# Patient Record
Sex: FEMALE | Race: BLACK OR AFRICAN AMERICAN | Employment: UNEMPLOYED | URBAN - METROPOLITAN AREA
[De-identification: names, ages, dates, MRNs, and addresses within clinical notes are randomized per-mention and may not be internally consistent; named-entity substitution may affect disease eponyms.]

---

## 2018-04-21 ENCOUNTER — OFFICE VISIT (OUTPATIENT)
Dept: CARDIOLOGY | Facility: CLINIC | Age: 73
End: 2018-04-21
Payer: COMMERCIAL

## 2018-04-21 VITALS
OXYGEN SATURATION: 97 % | DIASTOLIC BLOOD PRESSURE: 81 MMHG | SYSTOLIC BLOOD PRESSURE: 142 MMHG | HEART RATE: 77 BPM | WEIGHT: 202.6 LBS

## 2018-04-21 DIAGNOSIS — R07.89 ATYPICAL CHEST PAIN: Primary | ICD-10-CM

## 2018-04-21 DIAGNOSIS — R06.09 DOE (DYSPNEA ON EXERTION): ICD-10-CM

## 2018-04-21 PROCEDURE — 99204 OFFICE O/P NEW MOD 45 MIN: CPT | Mod: ZP | Performed by: INTERNAL MEDICINE

## 2018-04-21 PROCEDURE — G0463 HOSPITAL OUTPT CLINIC VISIT: HCPCS

## 2018-04-21 RX ORDER — BUDESONIDE AND FORMOTEROL FUMARATE DIHYDRATE 160; 4.5 UG/1; UG/1
2 AEROSOL RESPIRATORY (INHALATION)
COMMUNITY
Start: 2014-03-26

## 2018-04-21 RX ORDER — PREDNISONE 5 MG/1
15 TABLET ORAL
COMMUNITY
Start: 2016-06-21

## 2018-04-21 RX ORDER — LISINOPRIL AND HYDROCHLOROTHIAZIDE 12.5; 2 MG/1; MG/1
1 TABLET ORAL
COMMUNITY
Start: 2013-11-15

## 2018-04-21 RX ORDER — IBUPROFEN 200 MG
200 TABLET ORAL
COMMUNITY
Start: 2017-10-25

## 2018-04-21 RX ORDER — ACETAMINOPHEN 325 MG/1
650 TABLET ORAL
COMMUNITY
Start: 2017-10-25

## 2018-04-21 RX ORDER — OMEPRAZOLE 40 MG/1
40 CAPSULE, DELAYED RELEASE ORAL
COMMUNITY
Start: 2016-01-12

## 2018-04-21 RX ORDER — BLOOD PRESSURE TEST KIT-MEDIUM
KIT MISCELLANEOUS
COMMUNITY
Start: 2013-01-21

## 2018-04-21 RX ORDER — ALBUTEROL SULFATE 90 UG/1
2 AEROSOL, METERED RESPIRATORY (INHALATION)
COMMUNITY
Start: 2017-10-25

## 2018-04-21 ASSESSMENT — PAIN SCALES - GENERAL: PAINLEVEL: MODERATE PAIN (5)

## 2018-04-21 NOTE — LETTER
4/21/2018      RE: Priya Trujillo  C/O RIDDHI VALENTIN  [address]  [facility] [address]       Dear Colleague,    Thank you for the opportunity to participate in the care of your patient, Priya Trujillo, at the [facility]. Please see a copy of my visit note below.       SUBJECTIVE:  Priya Trujillo is a 73 year old female who presents for evaluation of CARDOZA and Chest pain.  History difficult and through .    States she has exertional chest pain for long time. Now it is getting worse. Associated with CARDOZA. No PND. No other associated symptoms. Pain radiates to left arm.    HTN+. No other risk factors.    There are no active problems to display for this patient.   .  Current Outpatient Prescriptions   Medication Sig     acetaminophen (TYLENOL) 325 MG tablet Take 650 mg by mouth     albuterol (PROAIR HFA/PROVENTIL HFA/VENTOLIN HFA) 108 (90 Base) MCG/ACT Inhaler Inhale 2 puffs into the lungs     Blood Pressure Monitoring (RA BLOOD PRESSURE CUFF MONITOR) MISC Measure BP daily in the morning.     budesonide-formoterol (SYMBICORT) 160-4.5 MCG/ACT Inhaler Inhale 2 puffs into the lungs     calcium-vitamin D (CALTRATE) 600-400 MG-UNIT per tablet Take 1 tablet by mouth 2 times daily     COMPRESSION STOCKINGS 20-30 mm/Hg pantyhose style compression stockings - Leg Edema     ibuprofen (ADVIL/MOTRIN) 200 MG tablet Take 200 mg by mouth     ketotifen (ZADITOR/REFRESH ANTI-ITCH) 0.025 % SOLN ophthalmic solution 1 drop     LEVOFLOXACIN PO Take 500 mg by mouth daily     lisinopril-hydrochlorothiazide (PRINZIDE/ZESTORETIC) 20-12.5 MG per tablet Take 1 tablet by mouth     omeprazole (PRILOSEC) 40 MG capsule Take 40 mg by mouth     predniSONE (DELTASONE) 5 MG tablet Take 15 mg by mouth     No current facility-administered medications for this visit.      No past medical history on file.  No past surgical history on file.  Allergies   Allergen Reactions     Guaifenesin-Dm  Cr      Eyes and facial swelling, irregular heart beat. Also took tramadol at that time.     Naproxen      Body felt swollen, gastric upset     Sulfamethoxazole-Trimethoprim Hives     Tramadol      Swelling, constipation, ill feeling     Social History     Social History     Marital status: Single     Spouse name: N/A     Number of children: N/A     Years of education: N/A     Occupational History     Not on file.     Social History Main Topics     Smoking status: Never Smoker     Smokeless tobacco: Never Used     Alcohol use No     Drug use: No     Sexual activity: Not on file     Other Topics Concern     Not on file     Social History Narrative     No narrative on file     No family history on file.       REVIEW OF SYSTEMS:  General: negative, fever, chills, night sweats  Skin: negative, acne, rash and scaling  Eyes: negative, double vision, eye pain and photophobia  Ears/Nose/Throat: negative, nasal congestion and purulent rhinorrhea  Respiratory: No cough, No hemoptysis and negative  Cardiovascular: negative, palpitations, tachycardia, irregular heart beat, chest pain, paroxysmal nocturnal dyspnea and orthopnea  Gastrointestinal: negative, dysphagia, nausea and vomiting  Genitourinary: negative, nocturia, dysuria and frequency  Musculoskeletal: negative, fracture, back pain and neck pain  Neurologic: negative, headaches, syncope, stroke and seizures  Psychiatric: negative, nervous breakdown, thoughts of self-harm and thoughts of hurting someone else  Hematologic/Lymphatic/Immunologic: negative, bleeding disorder, chills and fever  Endocrine: negative, cold intolerance, heat intolerance and hot flashes       OBJECTIVE:  Blood pressure 142/81, pulse 77, weight 91.9 kg (202 lb 9.6 oz), SpO2 97 %.  General Appearance: alert and no distress  Head: Normocephalic. No masses, lesions, tenderness or abnormalities  Eyes: conjuctiva clear, PERRL, EOM intact  Ears: External ears normal. Canals clear. TM's normal.  Nose:  Nares normal  Mouth: normal  Neck: Supple, no cervical adenopathy, no thyromegaly  Lungs: clear to auscultation  Cardiac: regular rate and rhythm, normal S1 and S2, no murmur  Abdomen: Soft, nontender.  Normal bowel sounds.  No hepatosplenomegaly or abnormal masses  Extremities: no peripheral edema, peripheral pulses normal  Musculoskeletal: negative  Neurological: Cranial nerves 2-12 intact, motor strength intact       ASSESSMENT/PLAN:  Here for evaluation of chest pain.  Had symptoms for long time. Now progressive.  HTN+. No other risk factors.  Not very active.overweight.  Will plan for a DSE to evaluate symptoms.  Per orders.   Return to Clinic PRN.    Sincerely,     DAVEY Bradley MD

## 2018-04-21 NOTE — PATIENT INSTRUCTIONS
Patient Instructions:  It was a pleasure to see you in the cardiology clinic today.      If you have any questions, you can reach my nurse, Chuck Elliott, at [phone].  Press Option #1 for the [facility], and then press Option #3 for nursing.    We are encouraging the use of [employer] to communicate with your HealthCare Provider    Medication Changes:    Studies Ordered:  1). Dobutamine Stress Echo for  04/27/18 @ 2pm    The results from today include:None    Please follow up: We will call you with the results    Sincerely,    DAVEY Reynoso MD     If you have an urgent need after hours (8:00 am to 4:30 pm) please call [phone] and ask for the cardiology fellow on call.            Dobutamine Stress Echocardiography (Echo)  This type of echocardiogram involves using dobutamine, a medicine that stimulates your heart similar to the way exercise does. It increases your heart rate and the squeezing function of your heart. Your healthcare provider gives you dobutamine through an IV. He or she then takes ultrasound pictures of your heart, using sound waves through a device placed on your chest wall (echocardiography). The pictures are taken before and after you get dobutamine. This lets your healthcare provider see if blood is flowing properly through the heart and if your heart is pumping normally. The test is often done in a hospital or cardiac testing center.    Before your test  Tell your healthcare provider what medicines you take, and ask if you should take them before the test. Don t smoke, drink alcohol, or have any caffeine for 12 hours before the test, or as directed by your healthcare provider. Don't eat for at least 4 hours before the test. Make sure to wear a two-piece outfit. You may need to undress from the waist up and put on a short hospital gown. Allow an extra hour for checking in and getting ready for the test.    During your test    Your healthcare provider places small  pads (electrodes) on your chest to record the electrical activity of your heart.    He or she starts an intravenous (IV) line in your arm.    A painless device (transducer) coated with cool gel is moved firmly over your chest. This device creates ultrasonic, inaudible sound waves that make images of your heart on a screen.    Your healthcare provider slowly gives you dobutamine through the IV, in small doses about every 3 minutes. It is normal to feel your heart pound for a few minutes, while the medicine is being given.    Your healthcare provider will take echo images before the infusion of the drug, during the infusion, and after your pulse returns to normal. Your healthcare provider may give you a second medicine to slow your heartbeat to a normal level.    Your healthcare provider will monitor your heart and blood pressure during and after the test.  After your test  When the test is over, you may return to your normal routine. Ask your healthcare provider about taking any medicine that you were told to skip before the test. Your healthcare provider will discuss your test results with you. The test results help your provider plan your treatment and any other tests you may need.  Report any symptoms  Be sure to tell your healthcare provider if you feel any of the following during the test:    Chest, arm, or jaw discomfort    Irregular heartbeat    Feeling flushed    Palpitations    Shortness of breath    Nausea    Headache    Numbness    Lightheadedness or like you might faint   Date Last Reviewed: 1/1/2017 2000-2017 [employer]. [address] [place] [address], [place] [address]. All rights reserved. This information is not intended as a substitute for professional medical care. Always follow your healthcare professional's instructions.

## 2018-04-21 NOTE — NURSING NOTE
Chief Complaint   Patient presents with     New Patient     Shortness of breath on excertion, abnormal stress test-recs will be faxed, appt      Vitals were taken and medications were reconciled.     Rozina Blanchard RMA  10:34 AM

## 2018-04-21 NOTE — PROGRESS NOTES
SUBJECTIVE:  Priya Trujillo is a 73 year old female who presents for evaluation of CARDOZA and Chest pain.  History difficult and through .    States she has exertional chest pain for long time. Now it is getting worse. Associated with CARDOZA. No PND. No other associated symptoms. Pain radiates to left arm.    HTN+. No other risk factors.    There are no active problems to display for this patient.   .  Current Outpatient Prescriptions   Medication Sig     acetaminophen (TYLENOL) 325 MG tablet Take 650 mg by mouth     albuterol (PROAIR HFA/PROVENTIL HFA/VENTOLIN HFA) 108 (90 Base) MCG/ACT Inhaler Inhale 2 puffs into the lungs     Blood Pressure Monitoring (RA BLOOD PRESSURE CUFF MONITOR) MISC Measure BP daily in the morning.     budesonide-formoterol (SYMBICORT) 160-4.5 MCG/ACT Inhaler Inhale 2 puffs into the lungs     calcium-vitamin D (CALTRATE) 600-400 MG-UNIT per tablet Take 1 tablet by mouth 2 times daily     COMPRESSION STOCKINGS 20-30 mm/Hg pantyhose style compression stockings - Leg Edema     ibuprofen (ADVIL/MOTRIN) 200 MG tablet Take 200 mg by mouth     ketotifen (ZADITOR/REFRESH ANTI-ITCH) 0.025 % SOLN ophthalmic solution 1 drop     LEVOFLOXACIN PO Take 500 mg by mouth daily     lisinopril-hydrochlorothiazide (PRINZIDE/ZESTORETIC) 20-12.5 MG per tablet Take 1 tablet by mouth     omeprazole (PRILOSEC) 40 MG capsule Take 40 mg by mouth     predniSONE (DELTASONE) 5 MG tablet Take 15 mg by mouth     No current facility-administered medications for this visit.      No past medical history on file.  No past surgical history on file.  Allergies   Allergen Reactions     Guaifenesin-Dm Cr      Eyes and facial swelling, irregular heart beat. Also took tramadol at that time.     Naproxen      Body felt swollen, gastric upset     Sulfamethoxazole-Trimethoprim Hives     Tramadol      Swelling, constipation, ill feeling     Social History     Social History     Marital status: Single     Spouse name: N/A      Number of children: N/A     Years of education: N/A     Occupational History     Not on file.     Social History Main Topics     Smoking status: Never Smoker     Smokeless tobacco: Never Used     Alcohol use No     Drug use: No     Sexual activity: Not on file     Other Topics Concern     Not on file     Social History Narrative     No narrative on file     No family history on file.       REVIEW OF SYSTEMS:  General: negative, fever, chills, night sweats  Skin: negative, acne, rash and scaling  Eyes: negative, double vision, eye pain and photophobia  Ears/Nose/Throat: negative, nasal congestion and purulent rhinorrhea  Respiratory: No cough, No hemoptysis and negative  Cardiovascular: negative, palpitations, tachycardia, irregular heart beat, chest pain, paroxysmal nocturnal dyspnea and orthopnea  Gastrointestinal: negative, dysphagia, nausea and vomiting  Genitourinary: negative, nocturia, dysuria and frequency  Musculoskeletal: negative, fracture, back pain and neck pain  Neurologic: negative, headaches, syncope, stroke and seizures  Psychiatric: negative, nervous breakdown, thoughts of self-harm and thoughts of hurting someone else  Hematologic/Lymphatic/Immunologic: negative, bleeding disorder, chills and fever  Endocrine: negative, cold intolerance, heat intolerance and hot flashes       OBJECTIVE:  Blood pressure 142/81, pulse 77, weight 91.9 kg (202 lb 9.6 oz), SpO2 97 %.  General Appearance: alert and no distress  Head: Normocephalic. No masses, lesions, tenderness or abnormalities  Eyes: conjuctiva clear, PERRL, EOM intact  Ears: External ears normal. Canals clear. TM's normal.  Nose: Nares normal  Mouth: normal  Neck: Supple, no cervical adenopathy, no thyromegaly  Lungs: clear to auscultation  Cardiac: regular rate and rhythm, normal S1 and S2, no murmur  Abdomen: Soft, nontender.  Normal bowel sounds.  No hepatosplenomegaly or abnormal masses  Extremities: no peripheral edema, peripheral pulses  normal  Musculoskeletal: negative  Neurological: Cranial nerves 2-12 intact, motor strength intact       ASSESSMENT/PLAN:  Here for evaluation of chest pain.  Had symptoms for long time. Now progressive.  HTN+. No other risk factors.  Not very active.overweight.  Will plan for a DSE to evaluate symptoms.  Per orders.   Return to Clinic PRN.

## 2018-04-21 NOTE — MR AVS SNAPSHOT
After Visit Summary   4/21/2018    Priya Trujillo    MRN: [id number]           Patient Information     Date Of Birth          1945        Visit Information        Provider Department      4/21/2018 10:15 AM DAVEY Bradley MD; [facility]        Today's Diagnoses     Atypical chest pain    -  1      Care Instructions    Patient Instructions:  It was a pleasure to see you in the cardiology clinic today.      If you have any questions, you can reach my nurse, Chuck Elliott, at [phone].  Press Option #1 for the [facility], and then press Option #3 for nursing.    We are encouraging the use of [employer] to communicate with your HealthCare Provider    Medication Changes:    Studies Ordered:  1). Dobutamine Stress Echo for  04/27/18 @ 2pm    The results from today include:None    Please follow up: We will call you with the results    Sincerely,    DAVEY Reynoso MD     If you have an urgent need after hours (8:00 am to 4:30 pm) please call [phone] and ask for the cardiology fellow on call.            Dobutamine Stress Echocardiography (Echo)  This type of echocardiogram involves using dobutamine, a medicine that stimulates your heart similar to the way exercise does. It increases your heart rate and the squeezing function of your heart. Your healthcare provider gives you dobutamine through an IV. He or she then takes ultrasound pictures of your heart, using sound waves through a device placed on your chest wall (echocardiography). The pictures are taken before and after you get dobutamine. This lets your healthcare provider see if blood is flowing properly through the heart and if your heart is pumping normally. The test is often done in a hospital or cardiac testing center.    Before your test  Tell your healthcare provider what medicines you take, and ask if you should take them before the test. Don t smoke, drink alcohol, or have  any caffeine for 12 hours before the test, or as directed by your healthcare provider. Don't eat for at least 4 hours before the test. Make sure to wear a two-piece outfit. You may need to undress from the waist up and put on a short hospital gown. Allow an extra hour for checking in and getting ready for the test.    During your test    Your healthcare provider places small pads (electrodes) on your chest to record the electrical activity of your heart.    He or she starts an intravenous (IV) line in your arm.    A painless device (transducer) coated with cool gel is moved firmly over your chest. This device creates ultrasonic, inaudible sound waves that make images of your heart on a screen.    Your healthcare provider slowly gives you dobutamine through the IV, in small doses about every 3 minutes. It is normal to feel your heart pound for a few minutes, while the medicine is being given.    Your healthcare provider will take echo images before the infusion of the drug, during the infusion, and after your pulse returns to normal. Your healthcare provider may give you a second medicine to slow your heartbeat to a normal level.    Your healthcare provider will monitor your heart and blood pressure during and after the test.  After your test  When the test is over, you may return to your normal routine. Ask your healthcare provider about taking any medicine that you were told to skip before the test. Your healthcare provider will discuss your test results with you. The test results help your provider plan your treatment and any other tests you may need.  Report any symptoms  Be sure to tell your healthcare provider if you feel any of the following during the test:    Chest, arm, or jaw discomfort    Irregular heartbeat    Feeling flushed    Palpitations    Shortness of breath    Nausea    Headache    Numbness    Lightheadedness or like you might faint   Date Last Reviewed: 1/1/2017 2000-2017 [employer]  [employer] [address] [place] [address]. All rights reserved. This information is not intended as a substitute for professional medical care. Always follow your healthcare professional's instructions.                Follow-ups after your visit        Your next 10 appointments already scheduled     Apr 27, 2018  2:00 PM CDT   (Arrive by 1:45 PM)   NM INJECTION with UUNMINJ1   [facility], Nuclear Medicine ([facility])    [address] [address]   [phone]            Apr 27, 2018  2:45 PM CDT   (Arrive by 2:30 PM)   NM SCAN3 with UUNM1   [facility], Nuclear Medicine ([facility])    [address] [address]   [phone]            Apr 27, 2018  3:15 PM CDT   Ekg Stress Nm Dobutamine with UUEKGNMS   [facility] ([facility])    [address] [facility] [address]               Apr 27, 2018  4:15 PM CDT   (Arrive by 4:00 PM)   NM MPI DOBUTAMINE with UUNM1   [facility], Nuclear Medicine ([facility])    [address] [address]   [phone]           For a ONE day exam: Allow 3-4 hours for test. For a TWO day exam: Allow  minutes PER day for test.  On the day of your resting scan: Please stop eating 3 hours before the test. You may drink water or juice.  On the day of your stress test: Stop all caffeine 12 hours before the test. This includes coffee, tea, soda pop, chocolate and certain medicines (such as Anacin, Excedrin and NoDoz). Also avoid decaf coffee and tea, as these contain small amounts of caffeine.  Stop eating 3 hours before the test. You may drink water.  You may need to stop some medicines before the test. Follow your doctor s orders. - If you take a beta blocker: Do not take your beta-blocker on the day before your test,  "unless specifically told to by your doctor. And do not take it on the day of your test. Bring it with you to take after the test. - If you take Aggrenox or dipyridamole (Persantine, Permole), stop taking it 48 hours before your test. - If you take Viagra, Cialis or Levitra, stop taking it 48 hours before your test. - If you take theophylline or aminophylline, stop taking it 12 hours before your test.  Do not take nitrates on the day of your test. Do not wear your Nitro-Patch.  Please wear a loose two-piece outfit. If you will have an exercise test, bring rubber-soled walking shoes.  When you arrive, please tell us if you have a pacemaker or ICD (implantable defibrillator).  Please call your Imaging Department at your exam site with any questions.              Future tests that were ordered for you today     Open Future Orders        Priority Expected Expires Ordered    NM Dobutamine stress test Routine 4/21/2018 7/20/2018 4/21/2018            Who to contact     If you have questions or need follow up information about today's clinic visit or your schedule please contact [facility] directly at [phone].  Normal or non-critical lab and imaging results will be communicated to you by [employer], letter or phone within 4 business days after the clinic has received the results. If you do not hear from us within 7 days, please contact the clinic through [employer] or phone. If you have a critical or abnormal lab result, we will notify you by phone as soon as possible.  Submit refill requests through [employer] or call your pharmacy and they will forward the refill request to us. Please allow 3 business days for your refill to be completed.          Additional Information About Your Visit        People [employer] Remember Information     [employer] Remember lets you send messages to your doctor, view your test results, renew your prescriptions, schedule appointments and more. To sign up, go to [email or web address] . Click on \"Log in\" on the left " "side of the screen, which will take you to the Welcome page. Then click on \"Sign up Now\" on the right side of the page.     You will be asked to enter the access code listed below, as well as some personal information. Please follow the directions to create your username and password.     Your access code is: [email or web address]  Expires: 2018  6:31 AM     Your access code will  in 90 days. If you need help or a new code, please call your [place] [facility] clinic or [phone].        Care EveryWhere ID     This is your Care EveryWhere ID. This could be used by other organizations to access your [facility] medical records  [id number]        Your Vitals Were     Pulse Pulse Oximetry                77 97%           Blood Pressure from Last 3 Encounters:   18 142/81    Weight from Last 3 Encounters:   18 91.9 kg (202 lb 9.6 oz)               Primary Care Provider Office Phone # Fax #    Roverto Mccrary [phone]       [facility] [address] [place] [address]        Equal Access to Services     [facility] Hospital[facility] : Hadii aad ku hadasho Soyung, waaxda luqadaha, qaybta kaalmada elvie, jessica reynoso . So [facility] [phone].    ATENCIÓN: Si habla español, tiene a quesada disposición servicios gratuitos de asistencia lingüística. [facility] [phone].    We comply with applicable federal civil rights laws and [place] laws. We do not discriminate on the basis of race, color, national origin, age, disability, sex, sexual orientation, or gender identity.            Thank you!     Thank you for choosing [facility]  for your care. Our goal is always to provide you with excellent care. Hearing back from our patients is one way we can continue to improve our services. Please take a few minutes to complete the written survey that you may receive in the mail after your visit with us. Thank you!             Your Updated Medication List - Protect others around " you: Learn how to safely use, store and throw away your medicines at www.disposemymeds.org.          This list is accurate as of 4/21/18 10:52 AM.  Always use your most recent med list.                   Brand Name Dispense Instructions for use Diagnosis    acetaminophen 325 MG tablet    TYLENOL     Take 650 mg by mouth        albuterol 108 (90 Base) MCG/ACT Inhaler    PROAIR HFA/PROVENTIL HFA/VENTOLIN HFA     Inhale 2 puffs into the lungs        calcium-vitamin D 600-400 MG-UNIT per tablet    CALTRATE     Take 1 tablet by mouth 2 times daily        COMPRESSION STOCKINGS      20-30 mm/Hg pantyhose style compression stockings - Leg Edema        ibuprofen 200 MG tablet    ADVIL/MOTRIN     Take 200 mg by mouth        ketotifen 0.025 % Soln ophthalmic solution    ZADITOR/REFRESH ANTI-ITCH     1 drop        LEVOFLOXACIN PO      Take 500 mg by mouth daily        lisinopril-hydrochlorothiazide 20-12.5 MG per tablet    PRINZIDE/ZESTORETIC     Take 1 tablet by mouth        omeprazole 40 MG capsule    priLOSEC     Take 40 mg by mouth        predniSONE 5 MG tablet    DELTASONE     Take 15 mg by mouth        RA BLOOD PRESSURE CUFF MONITOR Misc      Measure BP daily in the morning.        SYMBICORT 160-4.5 MCG/ACT Inhaler   Generic drug:  budesonide-formoterol      Inhale 2 puffs into the lungs

## 2018-04-27 ENCOUNTER — HOSPITAL ENCOUNTER (OUTPATIENT)
Dept: NUCLEAR MEDICINE | Facility: CLINIC | Age: 73
Setting detail: NUCLEAR MEDICINE
End: 2018-04-27
Attending: INTERNAL MEDICINE
Payer: COMMERCIAL

## 2018-04-27 ENCOUNTER — HOSPITAL ENCOUNTER (OUTPATIENT)
Dept: CARDIOLOGY | Facility: CLINIC | Age: 73
Discharge: HOME | End: 2018-04-27
Attending: INTERNAL MEDICINE | Admitting: INTERNAL MEDICINE
Payer: COMMERCIAL

## 2018-04-27 DIAGNOSIS — R07.89 ATYPICAL CHEST PAIN: ICD-10-CM

## 2018-04-27 PROCEDURE — 25000125 ZZHC RX 250: Performed by: INTERNAL MEDICINE

## 2018-04-27 PROCEDURE — 78452 HT MUSCLE IMAGE SPECT MULT: CPT

## 2018-04-27 PROCEDURE — 93017 CV STRESS TEST TRACING ONLY: CPT

## 2018-04-27 PROCEDURE — 34300033 ZZH RX 343: Performed by: INTERNAL MEDICINE

## 2018-04-27 PROCEDURE — 93018 CV STRESS TEST I&R ONLY: CPT | Performed by: INTERNAL MEDICINE

## 2018-04-27 PROCEDURE — 93016 CV STRESS TEST SUPVJ ONLY: CPT | Performed by: INTERNAL MEDICINE

## 2018-04-27 PROCEDURE — A9502 TC99M TETROFOSMIN: HCPCS | Performed by: INTERNAL MEDICINE

## 2018-04-27 PROCEDURE — 25000128 H RX IP 250 OP 636: Performed by: INTERNAL MEDICINE

## 2018-04-27 PROCEDURE — 78452 HT MUSCLE IMAGE SPECT MULT: CPT | Mod: 26 | Performed by: INTERNAL MEDICINE

## 2018-04-27 RX ORDER — METOPROLOL TARTRATE 1 MG/ML
.5-15 INJECTION, SOLUTION INTRAVENOUS
Status: DISCONTINUED | OUTPATIENT
Start: 2018-04-27 | End: 2018-04-28 | Stop reason: HOSPADM

## 2018-04-27 RX ORDER — DOBUTAMINE HYDROCHLORIDE 200 MG/100ML
5-50 INJECTION INTRAVENOUS CONTINUOUS PRN
Status: COMPLETED | OUTPATIENT
Start: 2018-04-27 | End: 2018-04-27

## 2018-04-27 RX ADMIN — METOPROLOL TARTRATE 2 MG: 1 INJECTION, SOLUTION INTRAVENOUS at 13:02

## 2018-04-27 RX ADMIN — TETROFOSMIN 36 MCI.: 1.38 INJECTION, POWDER, LYOPHILIZED, FOR SOLUTION INTRAVENOUS at 13:00

## 2018-04-27 RX ADMIN — TETROFOSMIN 9.8 MCI.: 1.38 INJECTION, POWDER, LYOPHILIZED, FOR SOLUTION INTRAVENOUS at 12:57

## 2018-04-27 RX ADMIN — ATROPINE SULFATE 0.2 MG: 0.4 INJECTION, SOLUTION INTRAMUSCULAR; INTRAVENOUS; SUBCUTANEOUS at 12:53

## 2018-04-27 RX ADMIN — DOBUTAMINE IN DEXTROSE 30 MCG/KG/MIN: 200 INJECTION, SOLUTION INTRAVENOUS at 12:46

## 2018-04-27 NOTE — PROGRESS NOTES
Pt here for NM dobutamine stress test.  Test, meds and side effects reviewed with patient.   present.  Achieved target HR at 40 mcg Dobutamine and a total of 0.2 mg IV atropine.  Gave a total of 2 mg IV Metoprolol to bring HR back to baseline.  Post monitoring complete and VSS.  Pt taken back to NM waiting area for post imaging pictures.

## 2020-03-15 ENCOUNTER — HOSPITAL ENCOUNTER (EMERGENCY)
Facility: CLINIC | Age: 75
Discharge: HOME | End: 2020-03-15
Attending: EMERGENCY MEDICINE | Admitting: EMERGENCY MEDICINE
Payer: COMMERCIAL

## 2020-03-15 ENCOUNTER — APPOINTMENT (OUTPATIENT)
Dept: CT IMAGING | Facility: CLINIC | Age: 75
End: 2020-03-15
Attending: EMERGENCY MEDICINE
Payer: COMMERCIAL

## 2020-03-15 VITALS
OXYGEN SATURATION: 100 % | SYSTOLIC BLOOD PRESSURE: 129 MMHG | WEIGHT: 203.1 LBS | RESPIRATION RATE: 16 BRPM | TEMPERATURE: 96.4 F | DIASTOLIC BLOOD PRESSURE: 76 MMHG | HEART RATE: 80 BPM

## 2020-03-15 DIAGNOSIS — R30.0 DYSURIA: ICD-10-CM

## 2020-03-15 LAB
ALBUMIN UR-MCNC: NEGATIVE MG/DL
ANION GAP SERPL CALCULATED.3IONS-SCNC: 7 MMOL/L (ref 3–14)
APPEARANCE UR: CLEAR
BASOPHILS # BLD AUTO: 0 10E9/L (ref 0–0.2)
BASOPHILS NFR BLD AUTO: 0 %
BILIRUB UR QL STRIP: NEGATIVE
BUN SERPL-MCNC: 13 MG/DL (ref 7–30)
CALCIUM SERPL-MCNC: 8.6 MG/DL (ref 8.5–10.1)
CHLORIDE SERPL-SCNC: 103 MMOL/L (ref 94–109)
CO2 SERPL-SCNC: 27 MMOL/L (ref 20–32)
COLOR UR AUTO: YELLOW
CREAT SERPL-MCNC: 0.74 MG/DL (ref 0.52–1.04)
DIFFERENTIAL METHOD BLD: ABNORMAL
EOSINOPHIL # BLD AUTO: 0.1 10E9/L (ref 0–0.7)
EOSINOPHIL NFR BLD AUTO: 0.7 %
ERYTHROCYTE [DISTWIDTH] IN BLOOD BY AUTOMATED COUNT: 18.6 % (ref 10–15)
GFR SERPL CREATININE-BSD FRML MDRD: 80 ML/MIN/{1.73_M2}
GLUCOSE SERPL-MCNC: 105 MG/DL (ref 70–99)
GLUCOSE UR STRIP-MCNC: NEGATIVE MG/DL
HCT VFR BLD AUTO: 38 % (ref 35–47)
HGB BLD-MCNC: 12.5 G/DL (ref 11.7–15.7)
HGB UR QL STRIP: NEGATIVE
IMM GRANULOCYTES # BLD: 0.1 10E9/L (ref 0–0.4)
IMM GRANULOCYTES NFR BLD: 1 %
KETONES UR STRIP-MCNC: NEGATIVE MG/DL
LEUKOCYTE ESTERASE UR QL STRIP: NEGATIVE
LYMPHOCYTES # BLD AUTO: 2.9 10E9/L (ref 0.8–5.3)
LYMPHOCYTES NFR BLD AUTO: 24.5 %
MCH RBC QN AUTO: 25.7 PG (ref 26.5–33)
MCHC RBC AUTO-ENTMCNC: 32.9 G/DL (ref 31.5–36.5)
MCV RBC AUTO: 78 FL (ref 78–100)
MONOCYTES # BLD AUTO: 1.1 10E9/L (ref 0–1.3)
MONOCYTES NFR BLD AUTO: 9.5 %
MUCOUS THREADS #/AREA URNS LPF: PRESENT /LPF
NEUTROPHILS # BLD AUTO: 7.7 10E9/L (ref 1.6–8.3)
NEUTROPHILS NFR BLD AUTO: 64.3 %
NITRATE UR QL: NEGATIVE
NRBC # BLD AUTO: 0 10*3/UL
NRBC BLD AUTO-RTO: 0 /100
PH UR STRIP: 6 PH (ref 5–7)
PLATELET # BLD AUTO: 289 10E9/L (ref 150–450)
POTASSIUM SERPL-SCNC: 3.8 MMOL/L (ref 3.4–5.3)
RBC # BLD AUTO: 4.87 10E12/L (ref 3.8–5.2)
RBC #/AREA URNS AUTO: 1 /HPF (ref 0–2)
SODIUM SERPL-SCNC: 137 MMOL/L (ref 133–144)
SOURCE: ABNORMAL
SP GR UR STRIP: 1.02 (ref 1–1.03)
SQUAMOUS #/AREA URNS AUTO: <1 /HPF (ref 0–1)
UROBILINOGEN UR STRIP-MCNC: NORMAL MG/DL (ref 0–2)
WBC # BLD AUTO: 12 10E9/L (ref 4–11)
WBC #/AREA URNS AUTO: 1 /HPF (ref 0–5)

## 2020-03-15 PROCEDURE — 74176 CT ABD & PELVIS W/O CONTRAST: CPT

## 2020-03-15 PROCEDURE — 80048 BASIC METABOLIC PNL TOTAL CA: CPT | Performed by: EMERGENCY MEDICINE

## 2020-03-15 PROCEDURE — 99284 EMERGENCY DEPT VISIT MOD MDM: CPT | Mod: 25 | Performed by: EMERGENCY MEDICINE

## 2020-03-15 PROCEDURE — 85025 COMPLETE CBC W/AUTO DIFF WBC: CPT | Performed by: EMERGENCY MEDICINE

## 2020-03-15 PROCEDURE — 87086 URINE CULTURE/COLONY COUNT: CPT | Performed by: EMERGENCY MEDICINE

## 2020-03-15 PROCEDURE — 81001 URINALYSIS AUTO W/SCOPE: CPT | Performed by: EMERGENCY MEDICINE

## 2020-03-15 PROCEDURE — 99283 EMERGENCY DEPT VISIT LOW MDM: CPT | Mod: Z6 | Performed by: EMERGENCY MEDICINE

## 2020-03-15 RX ORDER — CEPHALEXIN 500 MG/1
500 CAPSULE ORAL 4 TIMES DAILY
Qty: 28 CAPSULE | Refills: 0 | Status: SHIPPED | OUTPATIENT
Start: 2020-03-15 | End: 2020-03-22

## 2020-03-15 ASSESSMENT — ENCOUNTER SYMPTOMS
FEVER: 0
DYSURIA: 1
VOMITING: 0
BACK PAIN: 0
FLANK PAIN: 0
NAUSEA: 0
CHILLS: 0

## 2020-03-15 NOTE — ED AVS SNAPSHOT
[facility], [place], Emergency Department  [address]  [facility] [address]  Phone:  [phone]  Fax:  [phone]                                    Priya Trujillo   MRN: [id number]    Department:  [facility], Emergency Department   Date of Visit:  3/15/2020           After Visit Summary Signature Page    I have received my discharge instructions, and my questions have been answered. I have discussed any challenges I see with this plan with the nurse or doctor.    ..........................................................................................................................................  Patient/Patient Representative Signature      ..........................................................................................................................................  Patient Representative Print Name and Relationship to Patient    ..................................................               ................................................  Date                                   Time    ..........................................................................................................................................  Reviewed by Signature/Title    ...................................................              ..............................................  Date                                               Time          22EPIC Rev 08/18

## 2020-03-15 NOTE — DISCHARGE INSTRUCTIONS
Please follow up with your primary care doctor in 2 days for recheck if no better.     You can take keflex as prescribed for possible urinary tract infection.     Seek medical attention if worsening/concerns.

## 2020-03-15 NOTE — ED PROVIDER NOTES
[facility] EMERGENCY DEPARTMENT ([facility])    3/15/20        History     Chief Complaint   Patient presents with     Abdominal Pain     reports burning lower abdominal pain and back pain and groin pain; thinks she has a UTI; denies fever or chills; feels urgency and pressure and a little burning when urinating     The history is provided by the patient.  used: [employer] intepreted by professional [employer] service.     Priya Trujillo is a 75 year old female with a past medical history pertinent for UTIs and HTN who presents to the Emergency Department for complaints of abdominal pain. She states that she feels the pain below her belly button, around her back, and in her kidney and bladder area. She notes that this pain started last night. She describes the pain as a burning sensation, and that before last night she was feeling fine. She notes the pain is constant. She also notes a burning sensation in her feet, and a mild cough. She finished her course of cefdinir this morning, and is also taking prednisone. She denies any diarrhea, nausea, fever, or changes in appetite. She is no longer having periods, and denies any history of surgeries on her abdomen.     I have reviewed the Medications, Allergies, Past Medical and Surgical History, and Social History in the [employer] system.  PAST MEDICAL HISTORY:   Past Medical History:   Diagnosis Date     Uncomplicated asthma        PAST SURGICAL HISTORY:   Past Surgical History:   Procedure Laterality Date     EYE SURGERY  2019       FAMILY HISTORY: No family history on file.    SOCIAL HISTORY:   Social History     Tobacco Use     Smoking status: Never Smoker     Smokeless tobacco: Never Used   Substance Use Topics     Alcohol use: No       Discharge Medication List as of 3/15/2020  4:38 PM      START taking these medications    Details   cephALEXin (KEFLEX) 500 MG capsule Take 1 capsule (500 mg) by mouth 4 times daily for 7 days, Disp-28 capsule,R-0, Local  Print         CONTINUE these medications which have NOT CHANGED    Details   acetaminophen (TYLENOL) 325 MG tablet Take 650 mg by mouth, Historical      albuterol (PROAIR HFA/PROVENTIL HFA/VENTOLIN HFA) 108 (90 Base) MCG/ACT Inhaler Inhale 2 puffs into the lungs, Historical      Blood Pressure Monitoring (RA BLOOD PRESSURE CUFF MONITOR) MISC Measure BP daily in the morning., Historical      budesonide-formoterol (SYMBICORT) 160-4.5 MCG/ACT Inhaler Inhale 2 puffs into the lungs, Historical      calcium-vitamin D (CALTRATE) 600-400 MG-UNIT per tablet Take 1 tablet by mouth 2 times daily, Historical      COMPRESSION STOCKINGS 20-30 mm/Hg pantyhose style compression stockings - Leg Edema, Historical      ibuprofen (ADVIL/MOTRIN) 200 MG tablet Take 200 mg by mouth, Historical      ketotifen (ZADITOR/REFRESH ANTI-ITCH) 0.025 % SOLN ophthalmic solution 1 drop, Historical      LEVOFLOXACIN PO Take 500 mg by mouth daily, Historical      lisinopril-hydrochlorothiazide (PRINZIDE/ZESTORETIC) 20-12.5 MG per tablet Take 1 tablet by mouth, Historical      omeprazole (PRILOSEC) 40 MG capsule Take 40 mg by mouth, Historical      predniSONE (DELTASONE) 5 MG tablet Take 15 mg by mouth, Historical                Allergies   Allergen Reactions     Guaifenesin-Dm Cr      Eyes and facial swelling, irregular heart beat. Also took tramadol at that time.     Naproxen      Body felt swollen, gastric upset     Sulfamethoxazole-Trimethoprim Hives     Tramadol      Swelling, constipation, ill feeling        Review of Systems   Constitutional: Negative for chills and fever.   Gastrointestinal: Negative for nausea and vomiting. Abdominal distention: suprapubic discomfort.   Genitourinary: Positive for dysuria. Negative for flank pain.   Musculoskeletal: Negative for back pain.   All other systems reviewed and are negative.      Physical Exam   BP: 129/76  Pulse: 80  Temp: 96.4  F (35.8  C)  Resp: 16  Weight: 92.1 kg (203 lb 1.6 oz)  SpO2: 100  %      Physical Exam  Vitals signs and nursing note reviewed.   Constitutional:       Appearance: She is well-developed.   HENT:      Head: Normocephalic and atraumatic.      Nose: Nose normal.      Mouth/Throat:      Mouth: Mucous membranes are moist.   Eyes:      Extraocular Movements: Extraocular movements intact.   Neck:      Musculoskeletal: Normal range of motion and neck supple.   Cardiovascular:      Rate and Rhythm: Normal rate and regular rhythm.      Pulses: Normal pulses.      Heart sounds: Normal heart sounds.   Pulmonary:      Effort: Pulmonary effort is normal.      Breath sounds: Normal breath sounds.   Abdominal:      General: Abdomen is flat.      Palpations: Abdomen is soft.      Tenderness: There is no abdominal tenderness. There is no right CVA tenderness or left CVA tenderness.   Musculoskeletal: Normal range of motion.         General: No swelling, tenderness or deformity.      Right lower leg: Normal. No edema.      Left lower leg: Normal. No edema.      Right foot: Normal.      Left foot: Normal.   Skin:     General: Skin is warm and dry.   Neurological:      General: No focal deficit present.      Mental Status: She is alert.   Psychiatric:         Mood and Affect: Mood normal.         Behavior: Behavior normal.         ED Course   11:36 AM  The patient was seen and examined by Daniella Bingham MD in Room ED04.      Procedures                 No results found for this or any previous visit (from the past 24 hour(s)).  Medications - No data to display          Assessments & Plan (with Medical Decision Making)   The patient presents to the ED for 1 days of pain with urination and no systemic symptoms. No back pain on exam.  On exam she appears well. Vitals stable. UA was checked and is negative.  UC was sent.  WBC is 12 but she is finishing prednisone.  Bmp is normal except glucose 105. CT abd/pelvis without contrast was done and shows moderate amount of stool in ascending and transverse colon, no  calculi.  The cause of her pain is unclear.  Given she is still experiencing dysuria I will treat with keflex and send UC.  I have asked that she follow up with pmd in 2 days for recheck.  Return if worsening.     I have reviewed the nursing notes.    I have reviewed the findings, diagnosis, plan and need for follow up with the patient.    Discharge Medication List as of 3/15/2020  4:38 PM      START taking these medications    Details   cephALEXin (KEFLEX) 500 MG capsule Take 1 capsule (500 mg) by mouth 4 times daily for 7 days, Disp-28 capsule,R-0, Local Print             Final diagnoses:   Dysuria       3/15/2020   [facility], [place], EMERGENCY DEPARTMENT    IAndre, am serving as a trained medical scribe to document services personally performed by Daniella Bingham MD, based on the provider's statements to me.     IDaniella MD, was physically present and have reviewed and verified the accuracy of this note documented by Andre Carbajal.       Daniella Bingham MD  03/28/20 [id number]

## 2020-03-16 LAB
BACTERIA SPEC CULT: NORMAL
Lab: NORMAL
SPECIMEN SOURCE: NORMAL

## 2024-01-23 ENCOUNTER — MEDICAL CORRESPONDENCE (OUTPATIENT)
Dept: HEALTH INFORMATION MANAGEMENT | Facility: CLINIC | Age: 79
End: 2024-01-23
Payer: COMMERCIAL

## 2024-01-23 ENCOUNTER — TRANSFERRED RECORDS (OUTPATIENT)
Dept: HEALTH INFORMATION MANAGEMENT | Facility: CLINIC | Age: 79
End: 2024-01-23
Payer: COMMERCIAL

## 2024-02-16 ENCOUNTER — TRANSFERRED RECORDS (OUTPATIENT)
Dept: HEALTH INFORMATION MANAGEMENT | Facility: CLINIC | Age: 79
End: 2024-02-16
Payer: COMMERCIAL

## 2024-02-21 ENCOUNTER — TRANSCRIBE ORDERS (OUTPATIENT)
Dept: OTHER | Age: 79
End: 2024-02-21

## 2024-02-21 DIAGNOSIS — C57.02 MALIGNANT NEOPLASM OF FALLOPIAN TUBE, LEFT (H): Primary | ICD-10-CM

## 2024-08-02 ENCOUNTER — TRANSCRIBE ORDERS (OUTPATIENT)
Dept: OTHER | Age: 79
End: 2024-08-02

## 2024-08-02 DIAGNOSIS — C57.02 MALIGNANT NEOPLASM OF FALLOPIAN TUBE, LEFT (H): Primary | ICD-10-CM

## 2024-09-23 ENCOUNTER — OFFICE VISIT (OUTPATIENT)
Dept: RHEUMATOLOGY | Facility: CLINIC | Age: 79
End: 2024-09-23
Payer: COMMERCIAL

## 2024-09-23 VITALS
DIASTOLIC BLOOD PRESSURE: 81 MMHG | HEART RATE: 90 BPM | SYSTOLIC BLOOD PRESSURE: 127 MMHG | OXYGEN SATURATION: 97 % | WEIGHT: 200.8 LBS

## 2024-09-23 DIAGNOSIS — G62.9 NEUROPATHY: ICD-10-CM

## 2024-09-23 DIAGNOSIS — M17.12 PRIMARY OSTEOARTHRITIS OF LEFT KNEE: ICD-10-CM

## 2024-09-23 DIAGNOSIS — R53.83 FATIGUE, UNSPECIFIED TYPE: ICD-10-CM

## 2024-09-23 DIAGNOSIS — C57.02 MALIGNANT NEOPLASM OF FALLOPIAN TUBE, LEFT (H): ICD-10-CM

## 2024-09-23 DIAGNOSIS — M79.7 FIBROMYALGIA: Primary | ICD-10-CM

## 2024-09-23 LAB
CK SERPL-CCNC: 212 U/L (ref 26–192)
CRP SERPL-MCNC: 5.79 MG/L
ERYTHROCYTE [SEDIMENTATION RATE] IN BLOOD BY WESTERGREN METHOD: 32 MM/HR (ref 0–30)
RHEUMATOID FACT SERPL-ACNC: <10 IU/ML
TSH SERPL DL<=0.005 MIU/L-ACNC: 2.13 UIU/ML (ref 0.3–4.2)

## 2024-09-23 PROCEDURE — 86140 C-REACTIVE PROTEIN: CPT | Performed by: INTERNAL MEDICINE

## 2024-09-23 PROCEDURE — 84443 ASSAY THYROID STIM HORMONE: CPT | Performed by: INTERNAL MEDICINE

## 2024-09-23 PROCEDURE — 82550 ASSAY OF CK (CPK): CPT | Performed by: INTERNAL MEDICINE

## 2024-09-23 PROCEDURE — 86200 CCP ANTIBODY: CPT | Performed by: INTERNAL MEDICINE

## 2024-09-23 PROCEDURE — 86431 RHEUMATOID FACTOR QUANT: CPT | Performed by: INTERNAL MEDICINE

## 2024-09-23 PROCEDURE — 36415 COLL VENOUS BLD VENIPUNCTURE: CPT | Performed by: INTERNAL MEDICINE

## 2024-09-23 PROCEDURE — 85652 RBC SED RATE AUTOMATED: CPT | Performed by: INTERNAL MEDICINE

## 2024-09-23 PROCEDURE — 99204 OFFICE O/P NEW MOD 45 MIN: CPT | Performed by: INTERNAL MEDICINE

## 2024-09-23 NOTE — PROGRESS NOTES
Rheumatology Clinic Visit      Priya Trujillo MRN# [id number]   YOB: 1945 Age: 79 year old      Date of visit: 9/23/24   PCP: Roverto Mccrary at [facility]    Chief Complaint   Patient presents with:  Consult: Having pain in legs and feet. Pain in bones.       Assessment and Plan     1.  Fibromyalgia: Exam today is most consistent with fibromyalgia.  We reviewed the diagnosis and treatment options.  Advised that she establish care in the pain clinic.  Note that she is already followed in a pain clinic but would like to move her care to [facility].  - Pain management referral  - Labs today: ESR, CRP, RF, CCP, TSH , CK    Addendum: Rheumatology labs show only mild CRP and ESR elevations, and CK is just above the normal range.  RF and CCP negative.  TSH normal.  No identified autoimmune rheumatologic disease to explain her symptoms.    2.  Numbness and tingling of the feet: Reportedly started with cancer treatment.  Advised that she discuss with her primary care provider, her oncologist, and consider also discussing with pain management.    3.  Chronic left knee pain: Severe degenerative changes seen on outside MRI report and symptoms are consistent with OA.  2 weeks of benefit from previous steroid injection from an outside clinic per patient report, and she reports doing exercises with no improvement.  Considering that her left knee symptoms are limiting ambulation, I advised seeing orthopedics  - Orthopedic surgery referral    4.  Dizziness: Describes the room spinning.  Occurs intermittently.  Advised that she discuss with her primary care provider.    No active autoimmune rheumatologic disease identified today.     Total minutes spent in evaluation with patient, documentation, , and review of pertinent studies and chart notes: 46     Ms. Trujillo verbalized agreement with and understanding of the rational for the diagnosis and treatment plan.  All questions were answered to best of my ability and  the patient's satisfaction. Ms. Trujillo was advised to contact the clinic with any questions that may arise after the clinic visit.      Thank you for involving me in the care of the patient    Return to clinic: No scheduled return appointment in rheumatology needed at this time.     MARILYN   Priya Trujillo is a 79 year old female with a past medical history significant for asthma, hypertension, hyperlipidemia, and fallopian tube cancer who presents for initial rheumatology evaluation of diffuse body ache.    Today, 9/23/2024: Diffuse body pain for at least 3 years.  Body pain started with chemotherapy used for fallopian tube cancer.  Also experiencing numbness and tingling in the feet, worse at the toes.  No joint swelling.  Left knee pain worse with activity and improves with rest; no swelling; no increased warmth or overlying erythema; has had a steroid injection in the left knee once that resulted in no more than a couple weeks of improvement; not following with orthopedics currently.  Previous MRI that was scanned into the chart from [employer] documents marked tricompartmental left knee osteoarthritis and a complex tear of essentially the entire medial meniscus; see report for full details.  Diffuse back pain that radiates down her left leg; was followed in a pain clinic but she would like a second opinion from a different clinic.    Denies fevers, chills, nausea, vomiting, constipation, diarrhea. No abdominal pain.  No black or bloody stools.  No chest pain/pressure, palpitations, or shortness of breath. No LE swelling. No neck pain. No oral or nasal sores.  No rash. No sicca symptoms.  No jaw or tongue claudication.  No scalp tenderness.  No vision change/loss.  No new headache.  Has headaches on occasion as she has had her entire life.  Has experienced multiple occasions of dizziness where the room feels like it is spinning.  No difficulty raising her arms above her head or standing up from a chair except for the pain  of her left knee and low back    Patient denies history of polymyalgia rheumatica.  Not currently taking prednisone and when she was on prednisone in the past she did not find that it helped with any of her pain.     A Pawan  assisted the entire visit; the  was remote on the iPad via video    Tobacco: None  EtOH: None  Drugs: None    ROS   12 point review of system was completed and negative except as noted in the HPI      Active Problem List   There is no problem list on file for this patient.    Past Medical History     Past Medical History:   Diagnosis Date    Uncomplicated asthma      Past Surgical History     Past Surgical History:   Procedure Laterality Date    EYE SURGERY  2019     Allergy     Allergies   Allergen Reactions    Dextromethorphan-Guaifenesin      Eyes and facial swelling, irregular heart beat. Also took tramadol at that time.    Naproxen      Body felt swollen, gastric upset    Sulfamethoxazole-Trimethoprim Hives    Tramadol      Swelling, constipation, ill feeling     Current Medication List     Current Outpatient Medications   Medication Sig Dispense Refill    acetaminophen (TYLENOL) 325 MG tablet Take 650 mg by mouth      albuterol (PROAIR HFA/PROVENTIL HFA/VENTOLIN HFA) 108 (90 Base) MCG/ACT Inhaler Inhale 2 puffs into the lungs      Blood Pressure Monitoring (RA BLOOD PRESSURE CUFF MONITOR) MISC Measure BP daily in the morning.      budesonide-formoterol (SYMBICORT) 160-4.5 MCG/ACT Inhaler Inhale 2 puffs into the lungs      calcium-vitamin D (CALTRATE) 600-400 MG-UNIT per tablet Take 1 tablet by mouth 2 times daily      COMPRESSION STOCKINGS 20-30 mm/Hg pantyhose style compression stockings - Leg Edema      ibuprofen (ADVIL/MOTRIN) 200 MG tablet Take 200 mg by mouth      ketotifen (ZADITOR/REFRESH ANTI-ITCH) 0.025 % SOLN ophthalmic solution 1 drop      LEVOFLOXACIN PO Take 500 mg by mouth daily      lisinopril-hydrochlorothiazide (PRINZIDE/ZESTORETIC) 20-12.5 MG per  tablet Take 1 tablet by mouth      omeprazole (PRILOSEC) 40 MG capsule Take 40 mg by mouth      predniSONE (DELTASONE) 5 MG tablet Take 15 mg by mouth       No current facility-administered medications for this visit.       No current facility-administered medications for this visit.     Social History   See HPI    Family History   Denies family history of autoimmune rheumatologic disease    Physical Exam     Temp Readings from Last 3 Encounters:   03/15/20 96.4  F (35.8  C) (Oral)     BP Readings from Last 5 Encounters:   03/15/20 129/76   04/21/18 142/81     Pulse Readings from Last 1 Encounters:   03/15/20 80     Resp Readings from Last 1 Encounters:   03/15/20 16     There is no height or weight on file to calculate BMI.      GEN: NAD.  HEENT:  Anicteric, noninjected sclera. No obvious external lesions of the ear and nose. Hearing intact.  CV: S1, S2. RRR. No m/r/g  PULM: No increased work of breathing. CTA bilaterally   MSK: MCPs, PIPs, DIPs, wrist, elbows, shoulders, knees, ankles, and MTPs were diffusely tender to palpation but there was no swelling, increased warmth, or overlying erythema.  Left knee with crepitation but no effusion, increased warmth, or overlying erythema.  Bilateral thighs, lower legs, forearms, and upper arms tender to palpation.  All fibromyalgia tender points positive.  Shoulders with normal range of motion.    SKIN: No rash or jaundice seen  PSYCH: Alert. Appropriate.      Labs / Imaging (select studies)       CBC  Recent Labs   Lab Test 03/15/20  1208   WBC 12.0*   RBC 4.87   HGB 12.5   HCT 38.0   MCV 78   RDW 18.6*      MCH 25.7*   MCHC 32.9   NEUTROPHIL 64.3   LYMPH 24.5   MONOCYTE 9.5   EOSINOPHIL 0.7   BASOPHIL 0.0   ANEU 7.7   ALYM 2.9   NITIN 1.1   AEOS 0.1   ABAS 0.0     CMP  Recent Labs   Lab Test 03/15/20  1208      POTASSIUM 3.8   CHLORIDE 103   CO2 27   ANIONGAP 7   *   BUN 13   CR 0.74   GFRESTIMATED 80   GFRESTBLACK >90   EDIN 8.6      Calcium/VitaminD  Recent Labs   Lab Test 03/15/20  1208   EDIN 8.6     UA  Recent Labs   Lab Test 03/15/20  1210   COLOR Yellow   APPEARANCE Clear   URINEGLC Negative   URINEBILI Negative   SG 1.018   URINEPH 6.0   PROTEIN Negative   NITRITE Negative   UBLD Negative   LEUKEST Negative   WBCU 1   RBCU 1   MUCOUS Present*     Urine Microscopic  Recent Labs   Lab Test 03/15/20  1210   WBCU 1   RBCU 1   MUCOUS Present*     Immunization History     There is no immunization history on file for this patient.       Chart documentation done in part with Dragon Voice recognition Software. Although reviewed after completion, some word and grammatical error may remain.    Leonardo Caal MD

## 2024-09-23 NOTE — Clinical Note
Please fax my clinic note dated 9/23/2024 to MsLakeisha Cassandra's PCP:   Roverto Mccrary at [facility]  Thank you, Leonardo Caal MD 9/23/2024 12:58 PM

## 2024-09-23 NOTE — LETTER
9/23/2024      Priya Trujillo  [address]  [place] [address]        Rheumatology Clinic Visit      Priya Trujillo MRN# [id number]   YOB: 1945 Age: 79 year old      Date of visit: 9/23/24   PCP: Roverto Mccrary at [facility]    Chief Complaint   Patient presents with:  Consult: Having pain in legs and feet. Pain in bones.       Assessment and Plan     1.  Fibromyalgia: Exam today is most consistent with fibromyalgia.  We reviewed the diagnosis and treatment options.  Advised that she establish care in the pain clinic.  Note that she is already followed in a pain clinic but would like to move her care to [facility].  - Pain management referral  - Labs today: ESR, CRP, RF, CCP, TSH , CK    2.  Numbness and tingling of the feet: Reportedly started with cancer treatment.  Advised that she discuss with her primary care provider, her oncologist, and consider also discussing with pain management.    3.  Chronic left knee pain: Severe degenerative changes seen on outside MRI report and symptoms are consistent with OA.  2 weeks of benefit from previous steroid injection from an outside clinic per patient report, and she reports doing exercises with no improvement.  Considering that her left knee symptoms are limiting ambulation, I advised seeing orthopedics  - Orthopedic surgery referral    4.  Dizziness: Describes the room spinning.  Occurs intermittently.  Advised that she discuss with her primary care provider.    No active autoimmune rheumatologic disease identified today.     Total minutes spent in evaluation with patient, documentation, , and review of pertinent studies and chart notes: 46     Ms. Trujillo verbalized agreement with and understanding of the rational for the diagnosis and treatment plan.  All questions were answered to best of my ability and the patient's satisfaction. Ms. Trujillo was advised to contact the clinic with any questions that may arise after the clinic visit.       Thank you for involving me in the care of the patient    Return to clinic: No scheduled return appointment in rheumatology needed at this time.     HPI   Priya Trujillo is a 79 year old female with a past medical history significant for asthma, hypertension, hyperlipidemia, and fallopian tube cancer who presents for initial rheumatology evaluation of diffuse body ache.    Today, 9/23/2024: Diffuse body pain for at least 3 years.  Body pain started with chemotherapy used for fallopian tube cancer.  Also experiencing numbness and tingling in the feet, worse at the toes.  No joint swelling.  Left knee pain worse with activity and improves with rest; no swelling; no increased warmth or overlying erythema; has had a steroid injection in the left knee once that resulted in no more than a couple weeks of improvement; not following with orthopedics currently.  Previous MRI that was scanned into the chart from [employer] documents marked tricompartmental left knee osteoarthritis and a complex tear of essentially the entire medial meniscus; see report for full details.  Diffuse back pain that radiates down her left leg; was followed in a pain clinic but she would like a second opinion from a different clinic.    Denies fevers, chills, nausea, vomiting, constipation, diarrhea. No abdominal pain.  No black or bloody stools.  No chest pain/pressure, palpitations, or shortness of breath. No LE swelling. No neck pain. No oral or nasal sores.  No rash. No sicca symptoms.  No jaw or tongue claudication.  No scalp tenderness.  No vision change/loss.  No new headache.  Has headaches on occasion as she has had her entire life.  Has experienced multiple occasions of dizziness where the room feels like it is spinning.  No difficulty raising her arms above her head or standing up from a chair except for the pain of her left knee and low back    Patient denies history of polymyalgia rheumatica.  Not currently taking prednisone and when she  was on prednisone in the past she did not find that it helped with any of her pain.     A Pawan  assisted the entire visit; the  was remote on the iPad via video    Tobacco: None  EtOH: None  Drugs: None    ROS   12 point review of system was completed and negative except as noted in the HPI      Active Problem List   There is no problem list on file for this patient.    Past Medical History     Past Medical History:   Diagnosis Date     Uncomplicated asthma      Past Surgical History     Past Surgical History:   Procedure Laterality Date     EYE SURGERY  2019     Allergy     Allergies   Allergen Reactions     Dextromethorphan-Guaifenesin      Eyes and facial swelling, irregular heart beat. Also took tramadol at that time.     Naproxen      Body felt swollen, gastric upset     Sulfamethoxazole-Trimethoprim Hives     Tramadol      Swelling, constipation, ill feeling     Current Medication List     Current Outpatient Medications   Medication Sig Dispense Refill     acetaminophen (TYLENOL) 325 MG tablet Take 650 mg by mouth       albuterol (PROAIR HFA/PROVENTIL HFA/VENTOLIN HFA) 108 (90 Base) MCG/ACT Inhaler Inhale 2 puffs into the lungs       Blood Pressure Monitoring (RA BLOOD PRESSURE CUFF MONITOR) MISC Measure BP daily in the morning.       budesonide-formoterol (SYMBICORT) 160-4.5 MCG/ACT Inhaler Inhale 2 puffs into the lungs       calcium-vitamin D (CALTRATE) 600-400 MG-UNIT per tablet Take 1 tablet by mouth 2 times daily       COMPRESSION STOCKINGS 20-30 mm/Hg pantyhose style compression stockings - Leg Edema       ibuprofen (ADVIL/MOTRIN) 200 MG tablet Take 200 mg by mouth       ketotifen (ZADITOR/REFRESH ANTI-ITCH) 0.025 % SOLN ophthalmic solution 1 drop       LEVOFLOXACIN PO Take 500 mg by mouth daily       lisinopril-hydrochlorothiazide (PRINZIDE/ZESTORETIC) 20-12.5 MG per tablet Take 1 tablet by mouth       omeprazole (PRILOSEC) 40 MG capsule Take 40 mg by mouth       predniSONE  (DELTASONE) 5 MG tablet Take 15 mg by mouth       No current facility-administered medications for this visit.       No current facility-administered medications for this visit.     Social History   See HPI    Family History   Denies family history of autoimmune rheumatologic disease    Physical Exam     Temp Readings from Last 3 Encounters:   03/15/20 96.4  F (35.8  C) (Oral)     BP Readings from Last 5 Encounters:   03/15/20 129/76   04/21/18 142/81     Pulse Readings from Last 1 Encounters:   03/15/20 80     Resp Readings from Last 1 Encounters:   03/15/20 16     There is no height or weight on file to calculate BMI.      GEN: NAD.  HEENT:  Anicteric, noninjected sclera. No obvious external lesions of the ear and nose. Hearing intact.  CV: S1, S2. RRR. No m/r/g  PULM: No increased work of breathing. CTA bilaterally   MSK: MCPs, PIPs, DIPs, wrist, elbows, shoulders, knees, ankles, and MTPs were diffusely tender to palpation but there was no swelling, increased warmth, or overlying erythema.  Left knee with crepitation but no effusion, increased warmth, or overlying erythema.  Bilateral thighs, lower legs, forearms, and upper arms tender to palpation.  All fibromyalgia tender points positive.  Shoulders with normal range of motion.    SKIN: No rash or jaundice seen  PSYCH: Alert. Appropriate.      Labs / Imaging (select studies)       CBC  Recent Labs   Lab Test 03/15/20  1208   WBC 12.0*   RBC 4.87   HGB 12.5   HCT 38.0   MCV 78   RDW 18.6*      MCH 25.7*   MCHC 32.9   NEUTROPHIL 64.3   LYMPH 24.5   MONOCYTE 9.5   EOSINOPHIL 0.7   BASOPHIL 0.0   ANEU 7.7   ALYM 2.9   NITIN 1.1   AEOS 0.1   ABAS 0.0     CMP  Recent Labs   Lab Test 03/15/20  1208      POTASSIUM 3.8   CHLORIDE 103   CO2 27   ANIONGAP 7   *   BUN 13   CR 0.74   GFRESTIMATED 80   GFRESTBLACK >90   EDIN 8.6     Calcium/VitaminD  Recent Labs   Lab Test 03/15/20  1208   EDIN 8.6     UA  Recent Labs   Lab Test 03/15/20  1210   COLOR Yellow    APPEARANCE Clear   URINEGLC Negative   URINEBILI Negative   SG 1.018   URINEPH 6.0   PROTEIN Negative   NITRITE Negative   UBLD Negative   LEUKEST Negative   WBCU 1   RBCU 1   MUCOUS Present*     Urine Microscopic  Recent Labs   Lab Test 03/15/20  1210   WBCU 1   RBCU 1   MUCOUS Present*     Immunization History     There is no immunization history on file for this patient.       Chart documentation done in part with Dragon Voice recognition Software. Although reviewed after completion, some word and grammatical error may remain.    Leonardo Caal MD        Sincerely,        Leonardo Caal MD

## 2024-09-24 ENCOUNTER — TRANSFERRED RECORDS (OUTPATIENT)
Dept: HEALTH INFORMATION MANAGEMENT | Facility: CLINIC | Age: 79
End: 2024-09-24

## 2024-09-24 LAB — CCP AB SER IA-ACNC: 1.2 U/ML

## 2024-09-27 ENCOUNTER — APPOINTMENT (OUTPATIENT)
Dept: INTERPRETER SERVICES | Facility: CLINIC | Age: 79
End: 2024-09-27
Payer: COMMERCIAL

## 2024-10-22 ENCOUNTER — MEDICAL CORRESPONDENCE (OUTPATIENT)
Dept: HEALTH INFORMATION MANAGEMENT | Facility: CLINIC | Age: 79
End: 2024-10-22
Payer: COMMERCIAL

## 2024-11-05 ENCOUNTER — TELEPHONE (OUTPATIENT)
Dept: PALLIATIVE MEDICINE | Facility: CLINIC | Age: 79
End: 2024-11-05
Payer: COMMERCIAL

## 2024-11-08 ENCOUNTER — OFFICE VISIT (OUTPATIENT)
Dept: ANESTHESIOLOGY | Facility: CLINIC | Age: 79
End: 2024-11-08
Attending: INTERNAL MEDICINE
Payer: COMMERCIAL

## 2024-11-08 VITALS — OXYGEN SATURATION: 99 % | SYSTOLIC BLOOD PRESSURE: 124 MMHG | DIASTOLIC BLOOD PRESSURE: 74 MMHG | HEART RATE: 79 BPM

## 2024-11-08 DIAGNOSIS — G89.29 CHRONIC BILATERAL LOW BACK PAIN, UNSPECIFIED WHETHER SCIATICA PRESENT: ICD-10-CM

## 2024-11-08 DIAGNOSIS — R20.0 NUMBNESS AND TINGLING OF BOTH FEET: ICD-10-CM

## 2024-11-08 DIAGNOSIS — M79.7 FIBROMYALGIA: Primary | ICD-10-CM

## 2024-11-08 DIAGNOSIS — R20.0 NUMBNESS AND TINGLING IN BOTH HANDS: ICD-10-CM

## 2024-11-08 DIAGNOSIS — R20.2 NUMBNESS AND TINGLING OF BOTH FEET: ICD-10-CM

## 2024-11-08 DIAGNOSIS — M79.605 BILATERAL LEG PAIN: ICD-10-CM

## 2024-11-08 DIAGNOSIS — M25.561 CHRONIC PAIN OF BOTH KNEES: ICD-10-CM

## 2024-11-08 DIAGNOSIS — M54.50 CHRONIC BILATERAL LOW BACK PAIN, UNSPECIFIED WHETHER SCIATICA PRESENT: ICD-10-CM

## 2024-11-08 DIAGNOSIS — G89.29 CHRONIC PAIN OF BOTH KNEES: ICD-10-CM

## 2024-11-08 DIAGNOSIS — G62.9 NEUROPATHY: ICD-10-CM

## 2024-11-08 DIAGNOSIS — M25.562 CHRONIC PAIN OF BOTH KNEES: ICD-10-CM

## 2024-11-08 DIAGNOSIS — M79.604 BILATERAL LEG PAIN: ICD-10-CM

## 2024-11-08 DIAGNOSIS — R20.2 NUMBNESS AND TINGLING IN BOTH HANDS: ICD-10-CM

## 2024-11-08 RX ORDER — NORTRIPTYLINE HYDROCHLORIDE 10 MG/1
10 CAPSULE ORAL AT BEDTIME
Qty: 30 CAPSULE | Refills: 1 | Status: SHIPPED | OUTPATIENT
Start: 2024-11-08

## 2024-11-08 ASSESSMENT — ANXIETY QUESTIONNAIRES
4. TROUBLE RELAXING: NEARLY EVERY DAY
GAD7 TOTAL SCORE: 3
5. BEING SO RESTLESS THAT IT IS HARD TO SIT STILL: NOT AT ALL
2. NOT BEING ABLE TO STOP OR CONTROL WORRYING: NOT AT ALL
6. BECOMING EASILY ANNOYED OR IRRITABLE: NOT AT ALL
GAD7 TOTAL SCORE: 3
3. WORRYING TOO MUCH ABOUT DIFFERENT THINGS: NOT AT ALL
GAD7 TOTAL SCORE: 3
IF YOU CHECKED OFF ANY PROBLEMS ON THIS QUESTIONNAIRE, HOW DIFFICULT HAVE THESE PROBLEMS MADE IT FOR YOU TO DO YOUR WORK, TAKE CARE OF THINGS AT HOME, OR GET ALONG WITH OTHER PEOPLE: NOT DIFFICULT AT ALL
8. IF YOU CHECKED OFF ANY PROBLEMS, HOW DIFFICULT HAVE THESE MADE IT FOR YOU TO DO YOUR WORK, TAKE CARE OF THINGS AT HOME, OR GET ALONG WITH OTHER PEOPLE?: NOT DIFFICULT AT ALL
7. FEELING AFRAID AS IF SOMETHING AWFUL MIGHT HAPPEN: NOT AT ALL
1. FEELING NERVOUS, ANXIOUS, OR ON EDGE: NOT AT ALL
7. FEELING AFRAID AS IF SOMETHING AWFUL MIGHT HAPPEN: NOT AT ALL

## 2024-11-08 ASSESSMENT — PAIN SCALES - PAIN ENJOYMENT GENERAL ACTIVITY SCALE (PEG)
PEG_TOTALSCORE: 8
AVG_PAIN_PASTWEEK: 8
AVG_PAIN_PASTWEEK: 8
INTERFERED_ENJOYMENT_LIFE: 8
INTERFERED_ENJOYMENT_LIFE: 8
PEG_TOTALSCORE: 8
INTERFERED_GENERAL_ACTIVITY: 8
INTERFERED_GENERAL_ACTIVITY: 8

## 2024-11-08 ASSESSMENT — PAIN SCALES - GENERAL: PAINLEVEL_OUTOF10: EXTREME PAIN (8)

## 2024-11-08 NOTE — LETTER
11/8/2024       RE: Priya Trujillo  [address]  [facility] [address]     Dear Colleague,    Thank you for referring your patient, Priya Trujillo, to the [facility]NAG[facility] [place] at [facility]. Please see a copy of my visit note below.      [facility] Pain Management     Date of visit: 11/8/2024    Assessment:  Priya Trujillo is a 79 year old female with a past medical history significant for polymyalgia rheumatica, joint pain, abdominal pain, left fallopian tube neoplasm, chemo induced neuropathy, who presents with complaints of widespread pain.     Widespread pain -etiology is likely multifactorial, including but certainly not limited to, underlying DJD, history polymyalgia rheumatica and chemo-induced neuropathy, fibromyalgia and prominent overlying myofascial component.    Assigned to [facility] nursing team.     Visit diagnoses:   1. Fibromyalgia    2. Neuropathy    3. Numbness and tingling of both feet    4. Numbness and tingling in both hands    5. Chronic pain of both knees    6. Chronic bilateral low back pain, unspecified whether sciatica present    7. Bilateral leg pain        Plan:  The following recommendations were given to the patient. Diagnosis, treatment options, risks, benefits, and alternatives were discussed, and all questions were answered. The patient expressed understanding of the plan for management.     I am recommending a multidisciplinary treatment plan to help this patient better manage her pain.  This includes:     Physical Therapy:  YES     Referral to [facility] for chronic pain focused therapy.  Referring externally, per patient/family request for pain PT option closest to their home.    Pain Psychology: NO    Diagnostic Studies:    No new orders today.    Medication Management:   Nortriptyline trial recommended, will start 10 mg at bedtime and monitor for pain and sleep benefit.   "Discussed potential CNS side effects and advised to contact clinic with any questions/concerns.  Diclofenac gel trial to both knees recommended.  Recommended consistent use, with application ideally 4 times daily.  Advised they may purchase over-the-counter if prescription not covered by insurance.    Procedures:   No recommendations at this time.    Other Orders/Referrals:   N/A    Follow up with PAULINE Fuentes CNP around 8 weeks or sooner if needed.        Review of Electronic Chart: Today I have also reviewed available medical information in the patient's medical record at [facility] ([facility]) and Care Everywhere (if available), including relevant provider notes, laboratory work, and imaging.     Sada Mahmood DNP, PAULINE, AGNP-C  [facility] Pain Management         -------------------------------------------------------------------    Subjective     Reason for consultation:    Priya Trujillo is a 79 year old female who is seen in consultation today at the request of Dr. Caal for evaluation of her pain issues and recommendations for management, with specific emphasis on  Fibromyalgia [M79.7]  - Primary      Neuropathy [G62.9]        My clinical question is:needs management of pain from: chrmotherapy associated neuropathy and fibromyalgia Reason for Referral:Comprehensive Pain Evaluation Are there any red flags that may impact the assessment or management of the patient?No Red Flags Provider, please review opioid agreement in the process instructions above. Do you agree to these terms?No Please indicate the reason for your response of \"No\" above. Note that one of our physician leaders will review your request and a member may reach out to follow up.if opioids are prescribed then please coordinate with the patient's primary care provider    Please see the [facility] health questionnaire which the patient completed and reviewed with me in detail (if available).     Review of [place] " Prescription Monitoring Program (): No concern for abuse or misuse of controlled medications based on this report.     Review of Electronic Chart: Today I have also reviewed available medical information in the patient's medical record at [facility] ([facility]), including relevant provider notes, laboratory work, and imaging.       Chief Complaint:    Chief Complaint   Patient presents with     Pain     Consult     New patient- pain all over         HPI:     Priya Trujillo is a 79 year old female presents with a chief complaint of widespread pain, neuropathy hands and feet (secondary to chemotherapy).     Onset/Location(s) of pain - widespread pain onset over the past few years. Also has low back and leg pain, prominent pain in neck/shoulders/upper back.   Pain qualities/characteristics - Pain is aching, worse at night.   Alleviating factors - Prayer.   Exacerbating factors - Worse at night.  Past treatments tried (H=helpful, NH=not helpful) -  oils. Injections in knee (Bruno).   Treatments never tried/potential options - neuropathics, pain focused PT.  Past pain clinic(s) - YES, went to [facility].     -Son is present for visit, daughter is on the phone. They help translate for her.     Patient Supplied Answers To the  Pain Questionnaire      11/8/2024     2:32 PM   [facility] -  Patient Entered Questionnaire/Answers   What number best describes your pain right now:  0 = No pain  to  10 = Worst pain imaginable 8   How would you describe the pain dull, aching   Which of the following worsen your pain nothing worsens the pain   Which of the following improve or reduce your pain nothing relieves the pain   What number best describes your average pain for the past week:  0 = No pain  to  10 = Worst pain imaginable 8   What number best describes your LOWEST pain in past 24 hours:  0 = No pain  to  10 = Worst pain imaginable 8   What number best describes your WORST pain in past 24 hours:  0 = No pain  to  10 = Worst  pain imaginable 8   When is your pain worst Night   What non-medicine treatments have you already had for your pain none        Current Pain Treatments:    Gabapentin 600 mg   Tylenol           Current Outpatient Medications   Medication Sig Dispense Refill     acetaminophen (TYLENOL) 325 MG tablet Take 650 mg by mouth       albuterol (PROAIR HFA/PROVENTIL HFA/VENTOLIN HFA) 108 (90 Base) MCG/ACT Inhaler Inhale 2 puffs into the lungs       Blood Pressure Monitoring (RA BLOOD PRESSURE CUFF MONITOR) MISC Measure BP daily in the morning.       budesonide-formoterol (SYMBICORT) 160-4.5 MCG/ACT Inhaler Inhale 2 puffs into the lungs       calcium-vitamin D (CALTRATE) 600-400 MG-UNIT per tablet Take 1 tablet by mouth 2 times daily.       COMPRESSION STOCKINGS 20-30 mm/Hg pantyhose style compression stockings - Leg Edema       diclofenac (VOLTAREN) 1 % topical gel Apply 4 g topically 4 times daily. 350 g 1     ibuprofen (ADVIL/MOTRIN) 200 MG tablet Take 200 mg by mouth.       ketotifen (ZADITOR/REFRESH ANTI-ITCH) 0.025 % SOLN ophthalmic solution 1 drop.       LEVOFLOXACIN PO Take 500 mg by mouth daily.       lisinopril-hydrochlorothiazide (PRINZIDE/ZESTORETIC) 20-12.5 MG per tablet Take 1 tablet by mouth       nortriptyline (PAMELOR) 10 MG capsule Take 1 capsule (10 mg) by mouth at bedtime. 30 capsule 1     omeprazole (PRILOSEC) 40 MG capsule Take 40 mg by mouth       predniSONE (DELTASONE) 5 MG tablet Take 15 mg by mouth.       No current facility-administered medications for this visit.     Allergies   Allergen Reactions     Dextromethorphan-Guaifenesin      Eyes and facial swelling, irregular heart beat. Also took tramadol at that time.     Naproxen      Body felt swollen, gastric upset     Sulfamethoxazole-Trimethoprim Hives     Tramadol      Swelling, constipation, ill feeling      Past Medical History:   Diagnosis Date     Uncomplicated asthma      Past Surgical History:   Procedure Laterality Date     EYE SURGERY   2019     No family history on file.  Social History     Socioeconomic History     Marital status: Single   Tobacco Use     Smoking status: Never     Smokeless tobacco: Never   Substance and Sexual Activity     Alcohol use: No     Drug use: No     Social Drivers of Health      [facility]    Social Connections      ROS: 10 point ROS neg other than the symptoms noted above in the HPI.      Objective      Diagnostic Testing - Imaging/Labs Reviewed:    Labs:    Personally reviewed BMP and CBC from 5/28/2024.    Imaging/Other testing:   Last EKG from 1/20/2024 reviewed, QT is WNL and QTc borderline at 460.      Physical Exam  HENT:      Head: Normocephalic.   Pulmonary:      Effort: Pulmonary effort is normal.   Musculoskeletal:      Comments: Positive myofascial TTP cervical nishant, traps. Limited exam due to pain and significant allodynia.    Neurological:      Mental Status: She is alert.      Comments: Positive allodynia.    Psychiatric:      Comments: Engaged throughout visit. Son present and helps add to history.            BILLING TIME DOCUMENTATION:   The total TIME spent on this patient on the date of the encounter/appointment was 36 minutes.      TOTAL TIME includes:   Time spent preparing to see the patient (reviewing records and tests)   Time spent face to face (or over the phone) with the patient   Time spent ordering tests, medications, procedures and referrals   Time spent Referring and communicating with other healthcare professionals   Time spent documenting clinical information in Epic       Again, thank you for allowing me to participate in the care of your patient.      Sincerely,    PAULINE Fuentes CNP

## 2024-11-08 NOTE — PROGRESS NOTES
[facility] Pain Management     Date of visit: 11/8/2024    Assessment:  Priya Trujillo is a 79 year old female with a past medical history significant for polymyalgia rheumatica, joint pain, abdominal pain, left fallopian tube neoplasm, chemo induced neuropathy, who presents with complaints of widespread pain.     Widespread pain -etiology is likely multifactorial, including but certainly not limited to, underlying DJD, history polymyalgia rheumatica and chemo-induced neuropathy, fibromyalgia and prominent overlying myofascial component.    Assigned to [facility] [place] [place] nursing team.     Visit diagnoses:   1. Fibromyalgia    2. Neuropathy    3. Numbness and tingling of both feet    4. Numbness and tingling in both hands    5. Chronic pain of both knees    6. Chronic bilateral low back pain, unspecified whether sciatica present    7. Bilateral leg pain        Plan:  The following recommendations were given to the patient. Diagnosis, treatment options, risks, benefits, and alternatives were discussed, and all questions were answered. The patient expressed understanding of the plan for management.     I am recommending a multidisciplinary treatment plan to help this patient better manage her pain.  This includes:     Physical Therapy:  YES     Referral to [facility] for chronic pain focused therapy.  Referring externally, per patient/family request for pain PT option closest to their home.    Pain Psychology: NO    Diagnostic Studies:    No new orders today.    Medication Management:   Nortriptyline trial recommended, will start 10 mg at bedtime and monitor for pain and sleep benefit.  Discussed potential CNS side effects and advised to contact clinic with any questions/concerns.  Diclofenac gel trial to both knees recommended.  Recommended consistent use, with application ideally 4 times daily.  Advised they may purchase over-the-counter if prescription not covered by insurance.    Procedures:   No recommendations at this  "time.    Other Orders/Referrals:   N/A    Follow up with PAULINE Fuentes CNP around 8 weeks or sooner if needed.        Review of Electronic Chart: Today I have also reviewed available medical information in the patient's medical record at [facility] ([facility]) and Care Everywhere (if available), including relevant provider notes, laboratory work, and imaging.     Sada Mahmood DNP, PAULINE, AGNP-C  [facility] Pain Management         -------------------------------------------------------------------    Subjective     Reason for consultation:    Priya Trujillo is a 79 year old female who is seen in consultation today at the request of Dr. Caal for evaluation of her pain issues and recommendations for management, with specific emphasis on  Fibromyalgia [M79.7]  - Primary      Neuropathy [G62.9]        My clinical question is:needs management of pain from: chrmotherapy associated neuropathy and fibromyalgia Reason for Referral:Comprehensive Pain Evaluation Are there any red flags that may impact the assessment or management of the patient?No Red Flags Provider, please review opioid agreement in the process instructions above. Do you agree to these terms?No Please indicate the reason for your response of \"No\" above. Note that one of our physician leaders will review your request and a member may reach out to follow up.if opioids are prescribed then please coordinate with the patient's primary care provider    Please see the [facility] [place] health questionnaire which the patient completed and reviewed with me in detail (if available).     Review of [place] Prescription Monitoring Program (): No concern for abuse or misuse of controlled medications based on this report.     Review of Electronic Chart: Today I have also reviewed available medical information in the patient's medical record at [facility] ([facility]), including relevant provider notes, laboratory work, and imaging. "       Chief Complaint:    Chief Complaint   Patient presents with    Pain    Consult     New patient- pain all over         HPI:     Priya Trujillo is a 79 year old female presents with a chief complaint of widespread pain, neuropathy hands and feet (secondary to chemotherapy).     Onset/Location(s) of pain - widespread pain onset over the past few years. Also has low back and leg pain, prominent pain in neck/shoulders/upper back.   Pain qualities/characteristics - Pain is aching, worse at night.   Alleviating factors - Prayer.   Exacerbating factors - Worse at night.  Past treatments tried (H=helpful, NH=not helpful) -  oils. Injections in knee (Bruno).   Treatments never tried/potential options - neuropathics, pain focused PT.  Past pain clinic(s) - YES, went to [facility].     -Son is present for visit, daughter is on the phone. They help translate for her.     Patient Supplied Answers To the  Pain Questionnaire      11/8/2024     2:32 PM    Pain -  Patient Entered Questionnaire/Answers   What number best describes your pain right now:  0 = No pain  to  10 = Worst pain imaginable 8   How would you describe the pain dull, aching   Which of the following worsen your pain nothing worsens the pain   Which of the following improve or reduce your pain nothing relieves the pain   What number best describes your average pain for the past week:  0 = No pain  to  10 = Worst pain imaginable 8   What number best describes your LOWEST pain in past 24 hours:  0 = No pain  to  10 = Worst pain imaginable 8   What number best describes your WORST pain in past 24 hours:  0 = No pain  to  10 = Worst pain imaginable 8   When is your pain worst Night   What non-medicine treatments have you already had for your pain none        Current Pain Treatments:    Gabapentin 600 mg   Tylenol           Current Outpatient Medications   Medication Sig Dispense Refill    acetaminophen (TYLENOL) 325 MG tablet Take 650 mg by mouth      albuterol  (PROAIR HFA/PROVENTIL HFA/VENTOLIN HFA) 108 (90 Base) MCG/ACT Inhaler Inhale 2 puffs into the lungs      Blood Pressure Monitoring (RA BLOOD PRESSURE CUFF MONITOR) MISC Measure BP daily in the morning.      budesonide-formoterol (SYMBICORT) 160-4.5 MCG/ACT Inhaler Inhale 2 puffs into the lungs      calcium-vitamin D (CALTRATE) 600-400 MG-UNIT per tablet Take 1 tablet by mouth 2 times daily.      COMPRESSION STOCKINGS 20-30 mm/Hg pantyhose style compression stockings - Leg Edema      diclofenac (VOLTAREN) 1 % topical gel Apply 4 g topically 4 times daily. 350 g 1    ibuprofen (ADVIL/MOTRIN) 200 MG tablet Take 200 mg by mouth.      ketotifen (ZADITOR/REFRESH ANTI-ITCH) 0.025 % SOLN ophthalmic solution 1 drop.      LEVOFLOXACIN PO Take 500 mg by mouth daily.      lisinopril-hydrochlorothiazide (PRINZIDE/ZESTORETIC) 20-12.5 MG per tablet Take 1 tablet by mouth      nortriptyline (PAMELOR) 10 MG capsule Take 1 capsule (10 mg) by mouth at bedtime. 30 capsule 1    omeprazole (PRILOSEC) 40 MG capsule Take 40 mg by mouth      predniSONE (DELTASONE) 5 MG tablet Take 15 mg by mouth.       No current facility-administered medications for this visit.     Allergies   Allergen Reactions    Dextromethorphan-Guaifenesin      Eyes and facial swelling, irregular heart beat. Also took tramadol at that time.    Naproxen      Body felt swollen, gastric upset    Sulfamethoxazole-Trimethoprim Hives    Tramadol      Swelling, constipation, ill feeling      Past Medical History:   Diagnosis Date    Uncomplicated asthma      Past Surgical History:   Procedure Laterality Date    EYE SURGERY  2019     No family history on file.  Social History     Socioeconomic History    Marital status: Single   Tobacco Use    Smoking status: Never    Smokeless tobacco: Never   Substance and Sexual Activity    Alcohol use: No    Drug use: No     Social Drivers of Health      [facility]    Social Connections      ROS: 10 point ROS neg other than the symptoms noted above in the HPI.      Objective      Diagnostic Testing - Imaging/Labs Reviewed:    Labs:    Personally reviewed BMP and CBC from 5/28/2024.    Imaging/Other testing:   Last EKG from 1/20/2024 reviewed, QT is WNL and QTc borderline at 460.      Physical Exam  HENT:      Head: Normocephalic.   Pulmonary:      Effort: Pulmonary effort is normal.   Musculoskeletal:      Comments: Positive myofascial TTP cervical nishant, traps. Limited exam due to pain and significant allodynia.    Neurological:      Mental Status: She is alert.      Comments: Positive allodynia.    Psychiatric:      Comments: Engaged throughout visit. Son present and helps add to history.            BILLING TIME DOCUMENTATION:   The total TIME spent on this patient on the date of the encounter/appointment was 36 minutes.      TOTAL TIME includes:   Time spent preparing to see the patient (reviewing records and tests)   Time spent face to face (or over the phone) with the patient   Time spent ordering tests, medications, procedures and referrals   Time spent Referring and communicating with other healthcare professionals   Time spent documenting clinical information in Epic

## 2024-11-08 NOTE — PATIENT INSTRUCTIONS
Medications:    diclofenac (VOLTAREN) 1 % topical gel - Apply 4 g topically 4 times daily    nortriptyline (PAMELOR) 10 MG capsule - Take 1 capsule (10 mg) by mouth at bedtime  *Monitor for sedation effects, be careful with getting up and moving around.    Please provide the clinic with a minium of 1 week notice, on all prescription refills.       Referrals:    Pain Physical Therapy Referral placed to [facility]. We will fax a referral to them. Please contact them if you do not hear from them in 2 business days [phone].      Recommended Follow up:      Follow up in 8 weeks.       To speak with a nurse, schedule/reschedule/cancel a clinic appointment, or request a medication refill call: [phone].    You can also reach us by [email or web address]: [email or web address]

## 2024-11-08 NOTE — NURSING NOTE
Patient presents with:  Pain  Consult: New patient- pain all over      Extreme Pain (8)     Pain Medications       Analgesics Other Refills Start End     acetaminophen (TYLENOL) 325 MG tablet -- 10/25/2017 --    Sig - Route: Take 650 mg by mouth - Oral    Class: Historical            What medications are you using for pain? Tylenol, Gabapentin    Have you been seen by another pain clinic/ provider? Not sure    Expectations: to help with pain    Roseann Car, CMA

## 2024-11-11 ENCOUNTER — TELEPHONE (OUTPATIENT)
Dept: ANESTHESIOLOGY | Facility: CLINIC | Age: 79
End: 2024-11-11
Payer: COMMERCIAL

## 2024-11-13 NOTE — TELEPHONE ENCOUNTER
Left Voicemail with Family Member (2nd Attempt) & sent letter for the patient to call back and schedule the following:    Appointment type: Return Pain  Provider: Sada Mahmood  Visit mode: In Person  Return date: Approx. 1/8/25  Specialty phone number: [phone]    Additional Notes: Spoke to patient's son but did not have a Consent to Communicate form on file. Left callback number and recommended that the patient complete a C2C for an family members who might need to schedule appointments for her. tamia

## 2025-06-03 ENCOUNTER — HOSPITAL ENCOUNTER (EMERGENCY)
Facility: CLINIC | Age: 80
Discharge: HOME | End: 2025-06-04
Attending: EMERGENCY MEDICINE
Payer: COMMERCIAL

## 2025-06-03 DIAGNOSIS — Z51.5 END OF LIFE CARE: ICD-10-CM

## 2025-06-03 PROBLEM — J45.31 MILD PERSISTENT ASTHMA WITH ACUTE EXACERBATION: Status: ACTIVE | Noted: 2023-07-07

## 2025-06-03 PROBLEM — C48.1: Status: ACTIVE | Noted: 2025-06-03

## 2025-06-03 PROBLEM — K59.00 CONSTIPATION: Status: ACTIVE | Noted: 2025-06-03

## 2025-06-03 PROBLEM — C76.2 ABDOMINAL CARCINOMATOSIS (H): Status: ACTIVE | Noted: 2023-07-07

## 2025-06-03 PROBLEM — N39.0 URINARY TRACT INFECTIOUS DISEASE: Status: ACTIVE | Noted: 2025-06-03

## 2025-06-03 PROBLEM — F43.20 ADULT SITUATIONAL STRESS DISORDER: Status: ACTIVE | Noted: 2025-06-03

## 2025-06-03 PROBLEM — K57.92 DIVERTICULITIS: Status: ACTIVE | Noted: 2025-06-03

## 2025-06-03 PROBLEM — E11.69 TYPE 2 DIABETES MELLITUS WITH OTHER SPECIFIED COMPLICATION (H): Status: ACTIVE | Noted: 2025-06-03

## 2025-06-03 PROBLEM — A49.1 ENTEROCOCCAL INFECTION: Status: ACTIVE | Noted: 2025-05-22

## 2025-06-03 PROBLEM — H04.123 DRY EYE SYNDROME OF BOTH EYES: Status: ACTIVE | Noted: 2025-06-03

## 2025-06-03 PROBLEM — M35.3 POLYMYALGIA RHEUMATICA: Status: ACTIVE | Noted: 2023-07-07

## 2025-06-03 PROBLEM — J30.9 ALLERGIC RHINITIS: Status: ACTIVE | Noted: 2025-06-03

## 2025-06-03 PROBLEM — J44.1 ACUTE EXACERBATION OF CHRONIC OBSTRUCTIVE AIRWAYS DISEASE (H): Status: ACTIVE | Noted: 2025-06-03

## 2025-06-03 PROBLEM — F43.20 ADJUSTMENT REACTION: Status: ACTIVE | Noted: 2025-06-03

## 2025-06-03 PROCEDURE — 82330 ASSAY OF CALCIUM: CPT

## 2025-06-03 PROCEDURE — 258N000003 HC RX IP 258 OP 636: Performed by: EMERGENCY MEDICINE

## 2025-06-03 PROCEDURE — 99291 CRITICAL CARE FIRST HOUR: CPT | Performed by: EMERGENCY MEDICINE

## 2025-06-03 PROCEDURE — 250N000013 HC RX MED GY IP 250 OP 250 PS 637: Performed by: EMERGENCY MEDICINE

## 2025-06-03 PROCEDURE — 96374 THER/PROPH/DIAG INJ IV PUSH: CPT | Performed by: EMERGENCY MEDICINE

## 2025-06-03 PROCEDURE — 93010 ELECTROCARDIOGRAM REPORT: CPT | Performed by: EMERGENCY MEDICINE

## 2025-06-03 PROCEDURE — 83605 ASSAY OF LACTIC ACID: CPT

## 2025-06-03 PROCEDURE — 250N000011 HC RX IP 250 OP 636: Mod: JZ | Performed by: EMERGENCY MEDICINE

## 2025-06-03 PROCEDURE — 93005 ELECTROCARDIOGRAM TRACING: CPT | Performed by: EMERGENCY MEDICINE

## 2025-06-03 PROCEDURE — 99291 CRITICAL CARE FIRST HOUR: CPT | Mod: 25 | Performed by: EMERGENCY MEDICINE

## 2025-06-03 PROCEDURE — 82947 ASSAY GLUCOSE BLOOD QUANT: CPT

## 2025-06-03 PROCEDURE — 82803 BLOOD GASES ANY COMBINATION: CPT

## 2025-06-03 RX ORDER — HYDROMORPHONE HYDROCHLORIDE 1 MG/ML
0.5 INJECTION, SOLUTION INTRAMUSCULAR; INTRAVENOUS; SUBCUTANEOUS ONCE
Status: COMPLETED | OUTPATIENT
Start: 2025-06-03 | End: 2025-06-03

## 2025-06-03 RX ADMIN — SODIUM CHLORIDE 1000 ML: 0.9 INJECTION, SOLUTION INTRAVENOUS at 23:45

## 2025-06-03 RX ADMIN — ACETAMINOPHEN 650 MG: 325 SUPPOSITORY RECTAL at 23:47

## 2025-06-03 RX ADMIN — HYDROMORPHONE HYDROCHLORIDE 0.5 MG: 1 INJECTION, SOLUTION INTRAMUSCULAR; INTRAVENOUS; SUBCUTANEOUS at 23:53

## 2025-06-03 ASSESSMENT — COLUMBIA-SUICIDE SEVERITY RATING SCALE - C-SSRS: IS THE PATIENT NOT ABLE TO COMPLETE C-SSRS: UNABLE TO VERBALIZE

## 2025-06-04 VITALS
SYSTOLIC BLOOD PRESSURE: 102 MMHG | HEART RATE: 120 BPM | RESPIRATION RATE: 12 BRPM | DIASTOLIC BLOOD PRESSURE: 77 MMHG | TEMPERATURE: 104.7 F | OXYGEN SATURATION: 92 %

## 2025-06-04 LAB
ATRIAL RATE - MUSE: 142 BPM
BASE EXCESS BLDV CALC-SCNC: 28 MMOL/L (ref -3–3)
BASE EXCESS BLDV CALC-SCNC: 29 MMOL/L (ref -3–3)
CA-I BLD-MCNC: 3.7 MG/DL (ref 4.4–5.2)
CPB POCT: NO
DIASTOLIC BLOOD PRESSURE - MUSE: NORMAL MMHG
GLUCOSE BLD-MCNC: 132 MG/DL (ref 70–99)
HCO3 BLDV-SCNC: 54 MMOL/L (ref 21–28)
HCO3 BLDV-SCNC: 54 MMOL/L (ref 21–28)
HCT VFR BLD CALC: 34 % (ref 35–47)
HGB BLD-MCNC: 11.6 G/DL (ref 11.7–15.7)
INTERPRETATION ECG - MUSE: NORMAL
LACTATE BLD-SCNC: 5.6 MMOL/L (ref 0.7–2)
P AXIS - MUSE: NORMAL DEGREES
PCO2 BLDV: 55 MM HG (ref 40–50)
PCO2 BLDV: 56 MM HG (ref 40–50)
PH BLDV: 7.6 [PH] (ref 7.32–7.43)
PH BLDV: 7.6 [PH] (ref 7.32–7.43)
PO2 BLDV: 53 MM HG (ref 25–47)
PO2 BLDV: 53 MM HG (ref 25–47)
POTASSIUM BLD-SCNC: 3.7 MMOL/L (ref 3.4–5.3)
PR INTERVAL - MUSE: 176 MS
QRS DURATION - MUSE: 104 MS
QT - MUSE: 294 MS
QTC - MUSE: 452 MS
R AXIS - MUSE: 79 DEGREES
SAO2 % BLDV: 91 % (ref 70–75)
SAO2 % BLDV: 91 % (ref 70–75)
SODIUM BLD-SCNC: 140 MMOL/L (ref 135–145)
SYSTOLIC BLOOD PRESSURE - MUSE: NORMAL MMHG
T AXIS - MUSE: 18 DEGREES
VENTRICULAR RATE- MUSE: 142 BPM

## 2025-06-04 PROCEDURE — 96361 HYDRATE IV INFUSION ADD-ON: CPT | Performed by: EMERGENCY MEDICINE

## 2025-06-04 RX ORDER — DEXTROSE MONOHYDRATE 25 G/50ML
25-50 INJECTION, SOLUTION INTRAVENOUS
Status: DISCONTINUED | OUTPATIENT
Start: 2025-06-04 | End: 2025-06-04

## 2025-06-04 RX ORDER — NICOTINE POLACRILEX 4 MG
15-30 LOZENGE BUCCAL
Status: DISCONTINUED | OUTPATIENT
Start: 2025-06-04 | End: 2025-06-04

## 2025-06-04 RX ORDER — DEXTROSE MONOHYDRATE 100 MG/ML
INJECTION, SOLUTION INTRAVENOUS CONTINUOUS PRN
Status: DISCONTINUED | OUTPATIENT
Start: 2025-06-04 | End: 2025-06-04

## 2025-06-04 RX ORDER — OXYCODONE HYDROCHLORIDE 5 MG/1
5 TABLET ORAL ONCE
Refills: 0 | Status: DISCONTINUED | OUTPATIENT
Start: 2025-06-04 | End: 2025-06-04

## 2025-06-04 ASSESSMENT — ACTIVITIES OF DAILY LIVING (ADL)
ADLS_ACUITY_SCORE: 41
ADLS_ACUITY_SCORE: 41

## 2025-06-04 NOTE — ED PROVIDER NOTES
ED Provider Note  [facility] EMERGENCY DEPARTMENT ([facility])    6/03/25       ED PROVIDER NOTE     History     Chief Complaint   Patient presents with    Generalized Weakness     HPI  Priya Trujillo is a 80 year old female with a notable history of hypertension, abdominal carcinomatosis, fallopian tube carcinoma, and known SBO who presents to the ED via EMS unresponsive x2 days. She was recently admitted to [facility] 5/18-5/24/2025 for SBO and was found to have an infection where she was started on amoxicillin and offered palliative chemo, but declined and was discharged home on hospice and TPN. Patient has been actively dying at home per hospice service notes. She has been unresponsive x2 days at home, per family. Upon EMS arrival, patient was hot to the touch and tachycardic with heart rate in the 120s, blood glucose 116, and blood pressure 62/38. Patient's heart rate en route up to 140bpm. There is no specific advanced directive listed in patient's chart.       Past Medical History  Past Medical History:   Diagnosis Date    Uncomplicated asthma      Past Surgical History:   Procedure Laterality Date    EYE SURGERY  2019     acetaminophen (TYLENOL) 325 MG tablet  albuterol (PROAIR HFA/PROVENTIL HFA/VENTOLIN HFA) 108 (90 Base) MCG/ACT Inhaler  Blood Pressure Monitoring (RA BLOOD PRESSURE CUFF MONITOR) MISC  budesonide-formoterol (SYMBICORT) 160-4.5 MCG/ACT Inhaler  calcium-vitamin D (CALTRATE) 600-400 MG-UNIT per tablet  COMPRESSION STOCKINGS  diclofenac (VOLTAREN) 1 % topical gel  ibuprofen (ADVIL/MOTRIN) 200 MG tablet  ketotifen (ZADITOR/REFRESH ANTI-ITCH) 0.025 % SOLN ophthalmic solution  LEVOFLOXACIN PO  lisinopril-hydrochlorothiazide (PRINZIDE/ZESTORETIC) 20-12.5 MG per tablet  nortriptyline (PAMELOR) 10 MG capsule  omeprazole (PRILOSEC) 40 MG capsule  predniSONE (DELTASONE) 5 MG tablet      Allergies   Allergen Reactions    Dextromethorphan-Guaifenesin      Eyes  "and facial swelling, irregular heart beat. Also took tramadol at that time.    Naproxen      Body felt swollen, gastric upset    Sulfamethoxazole-Trimethoprim Hives    Tramadol      Swelling, constipation, ill feeling     Family History  No family history on file.  Social History   Social History     Tobacco Use    Smoking status: Never    Smokeless tobacco: Never   Substance Use Topics    Alcohol use: No    Drug use: No      A medically appropriate review of systems was performed with pertinent positives and negatives noted in the HPI, and all other systems negative.    Physical Exam   BP: 103/84  Pulse: (!) 142  Resp: 16  SpO2: 98 %  Physical Exam  Vitals and nursing note reviewed.   Constitutional:       General: She is not in acute distress.     Appearance: She is toxic-appearing. She is not diaphoretic.   HENT:      Head: Atraumatic.      Mouth/Throat:      Mouth: Mucous membranes are dry.   Cardiovascular:      Rate and Rhythm: Normal rate.      Heart sounds: Normal heart sounds.   Pulmonary:      Effort: No respiratory distress.      Breath sounds: No stridor.   Abdominal:      General: There is distension.   Musculoskeletal:      Cervical back: Neck supple.   Skin:     General: Skin is warm.      Findings: No rash.   Neurological:      Mental Status: She is unresponsive.      GCS: GCS eye subscore is 4. GCS verbal subscore is 1. GCS motor subscore is 1.      Cranial Nerves: No facial asymmetry.       ***    ED Course, Procedures, & Data      Procedures       {ED Course Selections (Optional):124164}  {ED Sepsis CMS Documentation (Optional):109488::\" \"}       No results found for any visits on 06/03/25.  Medications - No data to display  Labs Ordered and Resulted from Time of ED Arrival to Time of ED Departure - No data to display  No orders to display          {Critical Care Performed?:705635}    Assessment & Plan    ***    I have reviewed the nursing notes. I have reviewed the findings, diagnosis, plan and " need for follow up with the patient.    New Prescriptions    No medications on file       Final diagnoses:   None   ISpike, am serving as a trained medical scribe to document services personally performed by Olivia Andrews MD, based on the provider's statements to me.     Olivia GIL MD, was physically present and have reviewed and verified the accuracy of this note documented by Spike Fraire.     Olivia Andrews MD  [facility] EMERGENCY DEPARTMENT  6/3/2025   mmol/L   iStat Gases (lactate) venous, POCT     Status: Abnormal   Result Value Ref Range    Lactic Acid POCT 5.6 (HH) 0.7 - 2.0 mmol/L    Bicarbonate Venous POCT 54 (HH) 21 - 28 mmol/L    O2 Sat, Venous POCT 91 (H) 70 - 75 %    pCO2 Venous POCT 56 (H) 40 - 50 mm Hg    pH Venous POCT 7.60 (H) 7.32 - 7.43    pO2 Venous POCT 53 (H) 25 - 47 mm Hg    Base Excess/Deficit (+/-) POCT 29.0 (H) -3.0 - 3.0 mmol/L   EKG 12-lead, tracing only     Status: None (Preliminary result)   Result Value Ref Range    Systolic Blood Pressure  mmHg    Diastolic Blood Pressure  mmHg    Ventricular Rate 142 BPM    Atrial Rate 142 BPM    CT Interval 176 ms    QRS Duration 104 ms     ms    QTc 452 ms    P Axis  degrees    R AXIS 79 degrees    T Axis 18 degrees    Interpretation ECG       Sinus tachycardia  Minimal voltage criteria for LVH, may be normal variant ( Mirza product )  Marked ST abnormality, possible inferior subendocardial injury  Marked ST abnormality, possible anterolateral subendocardial injury  Abnormal ECG    Unconfirmed report - interpretation of this ECG is computer generated - see medical record for final interpretation       Medications   sodium chloride 0.9% BOLUS 1,000 mL (0 mLs Intravenous Stopped 6/4/25 0112)   acetaminophen (TYLENOL) Suppository 650 mg (650 mg Rectal $Given 6/3/25 2347)   HYDROmorphone (PF) (DILAUDID) injection 0.5 mg (0.5 mg Intravenous $Given 6/3/25 8683)     Labs Ordered and Resulted from Time of ED Arrival to Time of ED Departure   ISTAT GASES ELECTROLYTES ICA GLUCOSE VENOUS POCT - Abnormal       Result Value    CPB Applied No      Hematocrit POCT 34 (*)     Calcium, Ionized Whole Blood POCT 3.7 (*)     Glucose Whole Blood POCT 132 (*)     Bicarbonate Venous POCT 54 (*)     Hemoglobin POCT 11.6 (*)     Potassium POCT 3.7      Sodium POCT 140      pCO2 Venous POCT 55 (*)     pO2 Venous POCT 53 (*)     pH Venous POCT 7.60 (*)     O2 Sat, Venous POCT 91 (*)     Base Excess/Deficit (+/-) POCT  28.0 (*)    ISTAT GASES LACTATE VENOUS POCT - Abnormal    Lactic Acid POCT 5.6 (*)     Bicarbonate Venous POCT 54 (*)     O2 Sat, Venous POCT 91 (*)     pCO2 Venous POCT 56 (*)     pH Venous POCT 7.60 (*)     pO2 Venous POCT 53 (*)     Base Excess/Deficit (+/-) POCT 29.0 (*)      No orders to display          Critical Care Addendum  My initial assessment, based on my review of prehospital provider report, review of nursing observations, review of vital signs, focused history, physical exam, review of cardiac rhythm monitor, 12 lead ECG analysis, and discussion with family, established a high suspicion that Priya Trujillo has sepsis with indication for early goal-directed therapy, altered mental status, and peritonitis, which requires immediate intervention, and therefore she is critically ill.     After the initial assessment, the care team initiated multiple lab tests, initiated IV fluid administration, initiated medication therapy with tylenol suppository and dilaudid, and performed family conference for end of life goals of care discussion to provide stabilization care. Due to the critical nature of this patient, I reassessed physical exam, mental status, and respiratory status multiple times prior to her disposition.     Time also spent performing documentation, discussion with family to obtain medical information for decision making, reviewing test results, and coordination of care.     Critical care time (excluding teaching time and procedures): 39 minutes.       Assessment & Plan    Patient was brought in by EMS in the active stages of death.  Unresponsive, hypothermic, tachycardic, with evidence of sepsis and past medical history of advanced terminal abdominal carcinomatosis.  Per report from EMS, patient was previously on hospice but there is some conflict among family members which led them being called for transport.  On review of the chart I do confirm the patient is being cared for by hospice who has been  in communication with the family as recently as today with documentation that she is actively dying and note of some disagreement among patient's children on her management.  However the hospice notes indicate family was educated on what to do at the time of death and a  home was confirmed.  This documentation leads me to believe that there may be a discord among family members but no formal reversal of patient's wishes to be on hospice.  Will hold off on aggressive intervention until family arrives to the emergency department for a comprehensive discussion and clarification of goals of care.    Patient's power of  arrived to the hospital.  She is very tearful and distraught.  States that she was not home when the patient's son arrived and found her in this condition.  He is the one who called the ambulance but she is very tearful and upset because she knows the patient wanted to die in her own home instead of being in the hospital.  The patient's son is not the appointed power of  or surrogate decision maker.  However the daughter asks that we speak with him and answer his questions to help assist with this difficult circumstance.    The patient's son and other family members were allowed to come back to the emergency department bedside.  Given the patient's evidence of advanced septic shock with end-stage cancer and her previous decision to forego any treatment, I believe intubation or any aggressive intervention would be futile and contrary to the patient's and decision makers wishes.  We did have a long discussion with all the family members present and were able to help the patient's son come to terms with the fact that her diagnosis is terminal and there was no chance of her having a meaningful recovery from her illness.  We explained the nature of the invasive procedures that would be required to perform resuscitation on the patient in this condition and that this would not be in line  with her previously expressed wishes.  The patient's son eventually was able to come to an understanding and the family was able to universally agree to allow her to pass in the comfort of her own home.  Patient was transported back to her home by EMS so that she could die in accordance with her wishes under hospice care.    I have reviewed the nursing notes. I have reviewed the findings, diagnosis, plan and need for follow up with the patient.    Discharge Medication List as of 6/4/2025  1:27 AM          Final diagnoses:   End of life care   I, Spike Fraire, am serving as a trained medical scribe to document services personally performed by Olivia Andrews MD, based on the provider's statements to me.     IOlivia MD, was physically present and have reviewed and verified the accuracy of this note documented by Spike Fraire.     Olivia Andrews MD  [facility] EMERGENCY DEPARTMENT  6/3/2025     Olivia Andrews MD  06/08/25 0352

## 2025-06-04 NOTE — ED TRIAGE NOTES
Patient arrives unresponsive by ems but able to maintain respirations. Patient is a/o x3. Patient is tachycardic and febrile. Patient was unresponsive at home for the past 2 days per ems report. Family enroute to ER.      Triage Assessment (Adult)       Row Name 06/03/25 2339          Triage Assessment    Airway WDL WDL        Respiratory WDL    Respiratory WDL WDL        Skin Circulation/Temperature WDL    Skin Circulation/Temperature WDL all     Skin Temperature warm        Cardiac WDL    Cardiac WDL X;all     Pulse Rate & Regularity tachycardic        Cognitive/Neuro/Behavioral WDL    Cognitive/Neuro/Behavioral WDL all     Level of Consciousness unresponsive     Arousal Level unresponsive        [place] Coma Scale    Best Eye Response 1-->(E1) none     Best Motor Response 1-->(M1) none     Best Verbal Response 1-->(V1) none     [place] Coma Scale Score 3

## 2025-06-12 LAB
ATRIAL RATE - MUSE: 142 BPM
DIASTOLIC BLOOD PRESSURE - MUSE: NORMAL MMHG
INTERPRETATION ECG - MUSE: NORMAL
P AXIS - MUSE: NORMAL DEGREES
PR INTERVAL - MUSE: 176 MS
QRS DURATION - MUSE: 104 MS
QT - MUSE: 294 MS
QTC - MUSE: 452 MS
R AXIS - MUSE: 79 DEGREES
SYSTOLIC BLOOD PRESSURE - MUSE: NORMAL MMHG
T AXIS - MUSE: 18 DEGREES
VENTRICULAR RATE- MUSE: 142 BPM

## (undated) RX ORDER — DOBUTAMINE HYDROCHLORIDE 200 MG/100ML
INJECTION INTRAVENOUS
Status: DISPENSED
Start: 2018-04-27

## (undated) RX ORDER — METOPROLOL TARTRATE 1 MG/ML
INJECTION, SOLUTION INTRAVENOUS
Status: DISPENSED
Start: 2018-04-27